# Patient Record
Sex: FEMALE | Race: WHITE | Employment: FULL TIME | URBAN - METROPOLITAN AREA
[De-identification: names, ages, dates, MRNs, and addresses within clinical notes are randomized per-mention and may not be internally consistent; named-entity substitution may affect disease eponyms.]

---

## 2017-04-12 ENCOUNTER — OFFICE VISIT (OUTPATIENT)
Dept: INTERNAL MEDICINE CLINIC | Age: 32
End: 2017-04-12

## 2017-04-12 VITALS
OXYGEN SATURATION: 99 % | WEIGHT: 293 LBS | HEART RATE: 74 BPM | DIASTOLIC BLOOD PRESSURE: 78 MMHG | BODY MASS INDEX: 51.91 KG/M2 | SYSTOLIC BLOOD PRESSURE: 128 MMHG | HEIGHT: 63 IN | RESPIRATION RATE: 16 BRPM | TEMPERATURE: 98.4 F

## 2017-04-12 DIAGNOSIS — B96.89 ACUTE BACTERIAL SINUSITIS: Primary | ICD-10-CM

## 2017-04-12 DIAGNOSIS — J01.90 ACUTE BACTERIAL SINUSITIS: Primary | ICD-10-CM

## 2017-04-12 RX ORDER — AMOXICILLIN AND CLAVULANATE POTASSIUM 875; 125 MG/1; MG/1
1 TABLET, FILM COATED ORAL 2 TIMES DAILY
Qty: 20 TAB | Refills: 0 | Status: SHIPPED | OUTPATIENT
Start: 2017-04-12 | End: 2017-04-22

## 2017-04-12 NOTE — PROGRESS NOTES
Room 5    Chief Complaint   Patient presents with    Headache     x 1 week    Dizziness     x 1 week, worse as her day progressing, states more like lightheaded feeling. 1. Have you been to the ER, urgent care clinic since your last visit? Hospitalized since your last visit? Yes Where: March 2017 [facility]  in [place] for possible UTI, negative. 2. Have you seen or consulted any other health care providers outside of the [address] [place] [address] since your last visit? Include any pap smears or colon screening.  No     Health Maintenance Due   Topic Date Due    PAP AKA CERVICAL CYTOLOGY  08/01/2017

## 2017-04-12 NOTE — PROGRESS NOTES
ACUTE VISIT     HPI:   Brynn Justice is a 28 y.o. female, she presents today for:     Reports that she has a headache. Is thinking it might be a sinus infection because she had a cold last week, now with pressure in her forehead. Has had 1 dizzy spell. Has not had classic migraine like symptoms. Headache/pressure starting in afternoon, worsening most days. Also works in front of computer. Also sensitive to light. Not still having strong congestion (aka can breath through her nose). But definitely still has some drain. No blood in the drainage. Hasn't checked temperature. Has been using dorothea pot. Also has some allergie type symptoms. ROS: no change in vision, no nausea, normal breathing, minimal to no cough. Medications used for acute illness: see above. Current Outpatient Prescriptions on File Prior to Visit   Medication Sig    Phenyleph-Shark Oil-Glyc-Pet 0.25-3-12 % rectal cream Apply  to affected area four (4) times daily as needed for Hemorrhoids.  B.infantis-B.ani-B.long-B.bifi (PROBIOTIC 4X) 10-15 mg TbEC Take  by mouth.  cholecalciferol (VITAMIN D3) 1,000 unit tablet Take 1 tablet by mouth daily.  metFORMIN ER (GLUCOPHAGE XR) 500 mg tablet TAKE 4 TABLETS DAILY WITH DINNER    sertraline (ZOLOFT) 100 mg tablet Take 1 Tab by mouth daily.  Omeprazole delayed release (PRILOSEC D/R) 20 mg tablet Take 1 Tab by mouth daily.  SUMAtriptan (IMITREX) 50 mg tablet Take 1 Tab by mouth once as needed for Migraine for up to 1 dose.  butalbital-acetaminophen-caffeine (FIORICET, ESGIC) -40 mg per tablet Take 1 Tab by mouth every six (6) hours as needed for Headache.  norgestimate-ethinyl estradiol (SPRINTEC, 28,) 0.25-35 mg-mcg per tablet Take 1 Tab by mouth daily.  polyethylene glycol (MIRALAX) 17 gram/dose powder Take 17 g by mouth daily.  ondansetron (ZOFRAN ODT) 8 mg disintegrating tablet Take 1 tablet by mouth every eight (8) hours as needed for Nausea.     hyoscyamine SL (LEVSIN/SL) 0.125 mg SL tablet 1 Tab by SubLINGual route every four (4) hours as needed for Cramping.  alprazolam (XANAX) 0.25 mg tablet Take 1 Tab by mouth three (3) times daily as needed for Anxiety. No current facility-administered medications on file prior to visit. Allergies   Allergen Reactions    Topamax [Topiramate] Other (comments)     Suicidal thoughts       PMH/PSH/FH: reviewed and updated    Sochx:   reports that she has never smoked. She has never used smokeless tobacco. She reports that she does not drink alcohol or use illicit drugs. PE:  Blood pressure 143/86, pulse 74, temperature 98.4 °F (36.9 °C), temperature source Oral, resp. rate 16, height 5' 2.5\" (1.588 m), weight 296 lb 6.4 oz (134.4 kg), SpO2 99 %. Body mass index is 53.35 kg/(m^2). Physical Exam   Constitutional: She is oriented to person, place, and time. No distress. Well groomed, good eye contact and engagement with physician. HENT:   Head: Normocephalic. Mouth/Throat: Oropharynx is clear and moist.   + TM retraction on right, + nasal congestion and boggy anterior nares. Eyes: Conjunctivae and EOM are normal.   Neck: Neck supple. Cardiovascular: Normal rate, regular rhythm and normal heart sounds. Pulmonary/Chest: Effort normal and breath sounds normal.   Neurological: She is alert and oriented to person, place, and time. Skin: Skin is warm and dry. Nursing note and vitals reviewed. A/P  Steven Cruz was seen today for had concerns including Headache and Dizziness. .  The diagnosis and plan was discussed including:        ICD-10-CM ICD-9-CM    1. Acute bacterial sinusitis J01.90 461.9 amoxicillin-clavulanate (AUGMENTIN) 875-125 mg per tablet    B96.89       Frontal bacterial sinusitis: treat with agumentin x10 days. Discussed with patient increasing allergie medications as well. She declined rx. States she will get over the counter if not improving with neti pot and abx. - I advised her to call back or return to office if symptoms worsen/change/persist.  - She was given AVS and expressed understanding with the diagnosis and plan as discussed. Follow-up Disposition:  Return if symptoms worsen or fail to improve.

## 2017-04-12 NOTE — PATIENT INSTRUCTIONS
Sinusitis: Care Instructions  Your Care Instructions    Sinusitis is an infection of the lining of the sinus cavities in your head. Sinusitis often follows a cold. It causes pain and pressure in your head and face. In most cases, sinusitis gets better on its own in 1 to 2 weeks. But some mild symptoms may last for several weeks. Sometimes antibiotics are needed. Follow-up care is a key part of your treatment and safety. Be sure to make and go to all appointments, and call your doctor if you are having problems. It's also a good idea to know your test results and keep a list of the medicines you take. How can you care for yourself at home? · Take an over-the-counter pain medicine, such as acetaminophen (Tylenol), ibuprofen (Advil, Motrin), or naproxen (Aleve). Read and follow all instructions on the label. · If the doctor prescribed antibiotics, take them as directed. Do not stop taking them just because you feel better. You need to take the full course of antibiotics. · Be careful when taking over-the-counter cold or flu medicines and Tylenol at the same time. Many of these medicines have acetaminophen, which is Tylenol. Read the labels to make sure that you are not taking more than the recommended dose. Too much acetaminophen (Tylenol) can be harmful. · Breathe warm, moist air from a steamy shower, a hot bath, or a sink filled with hot water. Avoid cold, dry air. Using a humidifier in your home may help. Follow the directions for cleaning the machine. · Use saline (saltwater) nasal washes to help keep your nasal passages open and wash out mucus and bacteria. You can buy saline nose drops at a grocery store or drugstore. Or you can make your own at home by adding 1 teaspoon of salt and 1 teaspoon of baking soda to 2 cups of distilled water. If you make your own, fill a bulb syringe with the solution, insert the tip into your nostril, and squeeze gently. Genevieve Fogo your nose.   · Put a hot, wet towel or a warm gel pack on your face 3 or 4 times a day for 5 to 10 minutes each time. · Try a decongestant nasal spray like oxymetazoline (Afrin). Do not use it for more than 3 days in a row. Using it for more than 3 days can make your congestion worse. When should you call for help? Call your doctor now or seek immediate medical care if:  · You have new or worse swelling or redness in your face or around your eyes. · You have a new or higher fever. Watch closely for changes in your health, and be sure to contact your doctor if:  · You have new or worse facial pain. · The mucus from your nose becomes thicker (like pus) or has new blood in it. · You are not getting better as expected. Where can you learn more? Go to http://joce-bailey.info/. Enter Q145 in the search box to learn more about \"Sinusitis: Care Instructions. \"  Current as of: July 29, 2016  Content Version: 11.2  © 0833-0612 Healthwise, Incorporated. Care instructions adapted under license by [employer] (which disclaims liability or warranty for this information). If you have questions about a medical condition or this instruction, always ask your healthcare professional. [place] [address] any warranty or liability for your use of this information.

## 2017-04-12 NOTE — MR AVS SNAPSHOT
Visit Information Date & Time Provider Department Dept. Phone Encounter #  
 4/12/2017  3:45 PM Honey Ibarra MD [facility] and Internal Medicine [phone] [id number] Follow-up Instructions Return if symptoms worsen or fail to improve. Upcoming Health Maintenance Date Due  
 PAP AKA CERVICAL CYTOLOGY 8/1/2017 DTaP/Tdap/Td series (2 - Td) 7/10/2025 Allergies as of 4/12/2017  Review Complete On: 4/12/2017 By: Dorinda Gibson LPN Severity Noted Reaction Type Reactions Topamax [Topiramate]  10/16/2015    Other (comments) Suicidal thoughts Current Immunizations  Reviewed on 3/24/2015 Name Date Influenza Vaccine 10/11/2016, 10/2/2015, 10/15/2014, 10/11/2013 Influenza Vaccine Split 10/5/2011 Tdap 7/10/2015 Not reviewed this visit You Were Diagnosed With   
  
 Codes Comments Acute bacterial sinusitis    -  Primary ICD-10-CM: J01.90, B96.89 
ICD-9-CM: 461.9 Vitals BP Pulse Temp Resp Height(growth percentile) Weight(growth percentile) 128/78 74 98.4 °F (36.9 °C) (Oral) 16 5' 2.5\" (1.588 m) 296 lb 6.4 oz (134.4 kg) SpO2 BMI OB Status Smoking Status 99% 53.35 kg/m2 Having regular periods Never Smoker Vitals History BMI and BSA Data Body Mass Index Body Surface Area  
 53.35 kg/m 2 2.43 m 2 Preferred Pharmacy Pharmacy Name Phone Jasson Wesley [address] [place] [address], [address] [place] [address] [phone] Your Updated Medication List  
  
   
This list is accurate as of: 4/12/17  4:43 PM.  Always use your most recent med list.  
  
  
  
  
 amoxicillin-clavulanate 875-125 mg per tablet Commonly known as:  AUGMENTIN Take 1 Tab by mouth two (2) times a day for 10 days. cholecalciferol 1,000 unit tablet Commonly known as:  VITAMIN D3 Take 1 tablet by mouth daily. metFORMIN  mg tablet Commonly known as:  GLUCOPHAGE XR  
 TAKE 4 TABLETS DAILY WITH DINNER Phenyleph-Shark Oil-Glyc-Pet 0.25-3-12 % rectal cream  
Apply  to affected area four (4) times daily as needed for Hemorrhoids. PROBIOTIC 4X 10-15 mg Tbec Generic drug:  B.infantis-B.ani-B.long-B.bifi Take  by mouth. sertraline 100 mg tablet Commonly known as:  ZOLOFT Take 1 Tab by mouth daily. Prescriptions Sent to Pharmacy Refills  
 amoxicillin-clavulanate (AUGMENTIN) 875-125 mg per tablet 0 Sig: Take 1 Tab by mouth two (2) times a day for 10 days. Class: Normal  
 [facility]  #: [phone] Route: Oral  
  
Follow-up Instructions Return if symptoms worsen or fail to improve. Patient Instructions Sinusitis: Care Instructions Your Care Instructions Sinusitis is an infection of the lining of the sinus cavities in your head. Sinusitis often follows a cold. It causes pain and pressure in your head and face. In most cases, sinusitis gets better on its own in 1 to 2 weeks. But some mild symptoms may last for several weeks. Sometimes antibiotics are needed. Follow-up care is a key part of your treatment and safety. Be sure to make and go to all appointments, and call your doctor if you are having problems. It's also a good idea to know your test results and keep a list of the medicines you take. How can you care for yourself at home? · Take an over-the-counter pain medicine, such as acetaminophen (Tylenol), ibuprofen (Advil, Motrin), or naproxen (Aleve). Read and follow all instructions on the label. · If the doctor prescribed antibiotics, take them as directed. Do not stop taking them just because you feel better. You need to take the full course of antibiotics. · Be careful when taking over-the-counter cold or flu medicines and Tylenol at the same time.  Many of these medicines have acetaminophen, which is Tylenol. Read the labels to make sure that you are not taking more than the recommended dose. Too much acetaminophen (Tylenol) can be harmful. · Breathe warm, moist air from a steamy shower, a hot bath, or a sink filled with hot water. Avoid cold, dry air. Using a humidifier in your home may help. Follow the directions for cleaning the machine. · Use saline (saltwater) nasal washes to help keep your nasal passages open and wash out mucus and bacteria. You can buy saline nose drops at a grocery store or [employer]. Or you can make your own at home by adding 1 teaspoon of salt and 1 teaspoon of baking soda to 2 cups of distilled water. If you make your own, fill a bulb syringe with the solution, insert the tip into your nostril, and squeeze gently. [place] your nose. · Put a hot, wet towel or a warm gel pack on your face 3 or 4 times a day for 5 to 10 minutes each time. · Try a decongestant nasal spray like oxymetazoline (Afrin). Do not use it for more than 3 days in a row. Using it for more than 3 days can make your congestion worse. When should you call for help? Call your doctor now or seek immediate medical care if: 
· You have new or worse swelling or redness in your face or around your eyes. · You have a new or higher fever. Watch closely for changes in your health, and be sure to contact your doctor if: 
· You have new or worse facial pain. · The mucus from your nose becomes thicker (like pus) or has new blood in it. · You are not getting better as expected. Where can you learn more? Go to http://joce-bailey.info/. Enter T858 in the search box to learn more about \"Sinusitis: Care Instructions. \" Current as of: July 29, 2016 Content Version: 11.2 © 1824-7473 [employer]. Care instructions adapted under license by [employer] (which disclaims liability or warranty for this information).  If you have questions about a medical condition or this instruction, always ask your healthcare professional. [address] any warranty or liability for your use of this information. Introducing [facility] & HEALTH SERVICES! Dear Dallas Martinez: Thank you for requesting a [employer] account. Our records indicate that you already have an active [employer] account. You can access your account anytime at [email or web address] Did you know that you can access your hospital and ER discharge instructions at any time in [employer]? You can also review all of your test results from your hospital stay or ER visit. Additional Information If you have questions, please visit the Frequently Asked Questions section of the [employer] website at [email or web address] Remember, [employer] is NOT to be used for urgent needs. For medical emergencies, dial 911. Now available from your iPhone and Android! Please provide this summary of care documentation to your next provider. Your primary care clinician is listed as [address] If you have any questions after today's visit, please call [phone].

## 2017-06-12 LAB
CREATININE, EXTERNAL: 0.81
HBA1C MFR BLD HPLC: 5.2 %
LDL-C, EXTERNAL: 121

## 2017-08-30 DIAGNOSIS — K21.9 GASTROESOPHAGEAL REFLUX DISEASE, ESOPHAGITIS PRESENCE NOT SPECIFIED: Primary | ICD-10-CM

## 2017-08-30 RX ORDER — PHENOL/SODIUM PHENOLATE
20 AEROSOL, SPRAY (ML) MUCOUS MEMBRANE DAILY
Qty: 30 TAB | Refills: 2 | Status: SHIPPED | OUTPATIENT
Start: 2017-08-30 | End: 2018-05-28 | Stop reason: SDUPTHER

## 2017-10-24 ENCOUNTER — OFFICE VISIT (OUTPATIENT)
Dept: INTERNAL MEDICINE CLINIC | Age: 32
End: 2017-10-24

## 2017-10-24 VITALS
HEIGHT: 63 IN | HEART RATE: 72 BPM | OXYGEN SATURATION: 100 % | TEMPERATURE: 98.5 F | RESPIRATION RATE: 16 BRPM | SYSTOLIC BLOOD PRESSURE: 136 MMHG | DIASTOLIC BLOOD PRESSURE: 86 MMHG | WEIGHT: 293 LBS | BODY MASS INDEX: 51.91 KG/M2

## 2017-10-24 DIAGNOSIS — L98.9 SKIN LESION: ICD-10-CM

## 2017-10-24 DIAGNOSIS — K21.9 GASTROESOPHAGEAL REFLUX DISEASE, ESOPHAGITIS PRESENCE NOT SPECIFIED: ICD-10-CM

## 2017-10-24 DIAGNOSIS — F41.9 ANXIETY: ICD-10-CM

## 2017-10-24 DIAGNOSIS — E28.2 PCOS (POLYCYSTIC OVARIAN SYNDROME): ICD-10-CM

## 2017-10-24 DIAGNOSIS — Z00.00 WELL WOMAN EXAM (NO GYNECOLOGICAL EXAM): Primary | ICD-10-CM

## 2017-10-24 DIAGNOSIS — N92.6 IRREGULAR MENSES: ICD-10-CM

## 2017-10-24 NOTE — MR AVS SNAPSHOT
Visit Information Date & Time Provider Department Dept. Phone Encounter #  
 10/24/2017 11:30 AM Herberth Vogt, [address] [place] [address] and Internal Medicine [phone] [id number] Follow-up Instructions Return in about 1 year (around 10/24/2018), or if symptoms worsen or fail to improve, for wellness visit. Upcoming Health Maintenance Date Due  
 PAP AKA CERVICAL CYTOLOGY 7/24/2020 DTaP/Tdap/Td series (2 - Td) 7/10/2025 Allergies as of 10/24/2017  Review Complete On: 10/24/2017 By: Herberth Vogt MD  
  
 Severity Noted Reaction Type Reactions Topamax [Topiramate]  10/16/2015    Other (comments) Suicidal thoughts Current Immunizations  Reviewed on 10/24/2017 Name Date Influenza Vaccine 10/17/2017, 10/11/2016, 10/2/2015, 10/15/2014, 10/11/2013 Influenza Vaccine Split 10/5/2011 Tdap 7/10/2015 Reviewed by Herberth Vogt MD on 10/24/2017 at 12:36 PM  
You Were Diagnosed With   
  
 Codes Comments Well woman exam (no gynecological exam)    -  Primary ICD-10-CM: Z00.00 ICD-9-CM: V70.0 [V70.0] PCOS (polycystic ovarian syndrome)     ICD-10-CM: E28.2 ICD-9-CM: 256.4 Anxiety     ICD-10-CM: F41.9 ICD-9-CM: 300.00 Irregular menses     ICD-10-CM: N92.6 ICD-9-CM: 626.4 Gastroesophageal reflux disease, esophagitis presence not specified     ICD-10-CM: K21.9 ICD-9-CM: 530.81 Skin lesion     ICD-10-CM: L98.9 ICD-9-CM: 709.9 Vitals BP Pulse Temp Resp Height(growth percentile) Weight(growth percentile) 136/86 72 98.5 °F (36.9 °C) (Oral) 16 5' 2.5\" (1.588 m) 295 lb 12.8 oz (134.2 kg) SpO2 BMI OB Status Smoking Status 100% 53.24 kg/m2 Having regular periods Never Smoker Vitals History BMI and BSA Data Body Mass Index Body Surface Area  
 53.24 kg/m 2 2.43 m 2 Preferred Pharmacy Pharmacy Name Phone Citlalli Gamez [address], [facility] [phone] Your Updated Medication List  
  
   
This list is accurate as of: 10/24/17 12:47 PM.  Always use your most recent med list.  
  
  
  
  
 cholecalciferol 1,000 unit tablet Commonly known as:  VITAMIN D3 Take 1 tablet by mouth daily. LITHIUM CARBONATE PO Take  by mouth.  
  
 metFORMIN  mg tablet Commonly known as:  GLUCOPHAGE XR  
TAKE 4 TABLETS DAILY WITH DINNER Omeprazole delayed release 20 mg tablet Commonly known as:  PRILOSEC D/R Take 1 Tab by mouth daily. Phenyleph-Shark Oil-Glyc-Pet 0.25-3-12 % rectal cream  
Apply  to affected area four (4) times daily as needed for Hemorrhoids. PROBIOTIC 4X 10-15 mg Tbec Generic drug:  B.infantis-B.ani-B.long-B.bifi Take  by mouth. sertraline 100 mg tablet Commonly known as:  ZOLOFT Take 1 Tab by mouth daily. Follow-up Instructions Return in about 1 year (around 10/24/2018), or if symptoms worsen or fail to improve, for wellness visit. Introducing hospitals & HEALTH SERVICES! Dear Anabel Ridley: Thank you for requesting a [employer] account. Our records indicate that you already have an active [employer] account. You can access your account anytime at [email or web address] Did you know that you can access your hospital and ER discharge instructions at any time in [employer]? You can also review all of your test results from your hospital stay or ER visit. Additional Information If you have questions, please visit the Frequently Asked Questions section of the [employer] website at [email or web address] Remember, [employer] is NOT to be used for urgent needs. For medical emergencies, dial 911. Now available from your iPhone and Android! Please provide this summary of care documentation to your next provider. Your primary care clinician is listed as [address] If you have any questions after today's visit, please call [phone].

## 2017-10-24 NOTE — PROGRESS NOTES
Rm 14    Chief Complaint   Patient presents with    Complete Physical     1. Have you been to the ER, urgent care clinic since your last visit? Hospitalized since your last visit? No    2. Have you seen or consulted any other health care providers outside of the [address] [place] [address] since your last visit? Include any pap smears or colon screening.  No    Health Maintenance Due   Topic Date Due    PAP AKA CERVICAL CYTOLOGY  08/01/2017    INFLUENZA AGE 9 TO ADULT  08/01/2017     pap 7/24/17smear done   Flu shot done through employee [facility]  10/17/17

## 2017-10-30 PROBLEM — K64.9 HEMORRHOIDS: Status: ACTIVE | Noted: 2017-10-30

## 2017-12-27 ENCOUNTER — OFFICE VISIT (OUTPATIENT)
Dept: INTERNAL MEDICINE CLINIC | Age: 32
End: 2017-12-27

## 2017-12-27 VITALS
TEMPERATURE: 98.4 F | BODY MASS INDEX: 51.91 KG/M2 | OXYGEN SATURATION: 100 % | SYSTOLIC BLOOD PRESSURE: 143 MMHG | DIASTOLIC BLOOD PRESSURE: 91 MMHG | HEART RATE: 95 BPM | RESPIRATION RATE: 18 BRPM | WEIGHT: 293 LBS | HEIGHT: 63 IN

## 2017-12-27 DIAGNOSIS — R19.02 LEFT UPPER QUADRANT ABDOMINAL MASS: ICD-10-CM

## 2017-12-27 DIAGNOSIS — L03.311 CELLULITIS OF ABDOMINAL WALL: Primary | ICD-10-CM

## 2017-12-27 DIAGNOSIS — L98.8 SKIN LESION OF BREAST: ICD-10-CM

## 2017-12-27 PROBLEM — F33.9 RECURRENT DEPRESSION (HCC): Status: ACTIVE | Noted: 2017-12-27

## 2017-12-27 RX ORDER — LORAZEPAM 0.5 MG/1
0.5 TABLET ORAL
COMMUNITY

## 2017-12-27 RX ORDER — AMOXICILLIN AND CLAVULANATE POTASSIUM 875; 125 MG/1; MG/1
1 TABLET, FILM COATED ORAL 2 TIMES DAILY
Qty: 14 TAB | Refills: 0 | Status: SHIPPED | OUTPATIENT
Start: 2017-12-27 | End: 2018-01-03

## 2017-12-27 NOTE — PROGRESS NOTES
Chief Complaint   Patient presents with    Cyst     left side- noticed it on Sunday     1. Have you been to the ER, urgent care clinic since your last visit? Hospitalized since your last visit? No    2. Have you seen or consulted any other health care providers outside of the [address] [place] [address] since your last visit? Include any pap smears or colon screening.  Yes When: Luiza Davis 2017 Where: [facility]

## 2017-12-27 NOTE — PROGRESS NOTES
HISTORY OF PRESENT ILLNESS  Sylvester Curran is a 33 y.o. female presents for acute visit  HPI  Sunday noticed red spot left upper abdomen. Katheran Oppenheim area with alcohol and stuck area with pin after she cleaned it with rubbing alcohol. Redness in shape of adhesive dressing, no pain or drainage    Denies drainage or history of similar lesion. No sick contact, new medication, dietary change or travel    Asymptomatic red lesions right  Breast    Past Medical History:   Diagnosis Date    Anxiety     Depression     [facility] Elevated blood pressure 5/31/2011    Irregular menses     Migraine 8/2/2011    Obesity     DENISE (obstructive sleep apnea)     cpap at 8    PCOS (polycystic ovarian syndrome)     endocrine Dr. Brie Melo         Current Outpatient Prescriptions on File Prior to Visit   Medication Sig Dispense Refill    LITHIUM CARBONATE PO Take  by mouth.  B.infantis-B.ani-B.long-B.bifi (PROBIOTIC 4X) 10-15 mg TbEC Take  by mouth.  cholecalciferol (VITAMIN D3) 1,000 unit tablet Take 1 tablet by mouth daily. 30 tablet 11    metFORMIN ER (GLUCOPHAGE XR) 500 mg tablet TAKE 4 TABLETS DAILY WITH DINNER 120 tablet 11    sertraline (ZOLOFT) 100 mg tablet Take 1 Tab by mouth daily. 90 Tab 1    Omeprazole delayed release (PRILOSEC D/R) 20 mg tablet Take 1 Tab by mouth daily. 30 Tab 2     No current facility-administered medications on file prior to visit. Review of Systems   Constitutional: Negative for diaphoresis and fever. Gastrointestinal: Negative. Genitourinary: Negative. Musculoskeletal: Negative for joint pain and myalgias. Neurological: Negative. Physical Exam   Constitutional: She is oriented to person, place, and time. She appears well-developed and well-nourished. No distress. Cardiovascular: Normal rate, regular rhythm and normal heart sounds. Pulmonary/Chest: Effort normal and breath sounds normal.   Abdominal: Soft.  Bowel sounds are normal. She exhibits no distension. There is no tenderness. There is no rebound and no guarding. Musculoskeletal: She exhibits no edema, tenderness or deformity. Neurological: She is alert and oriented to person, place, and time. Skin: Skin is warm and dry. She is not diaphoretic. ASSESSMENT and PLAN    ICD-10-CM ICD-9-CM    1. Cellulitis of abdominal wall L03.311 682.2 amoxicillin-clavulanate (AUGMENTIN) 875-125 mg per tablet   2. Left upper quadrant abdominal mass R19.02 789.32    3. Skin lesion of breast N64.9 611.9      Follow-up Disposition:  Return in about 7 days (around 1/3/2018) for skin lesion. reviewed medications and side effects in detail    Discussed likely inflammatory lesions, encouraged warm compresses, reviewed s/sxs of worsening infection and indications to call provider before follow up visit    Patient voices understanding and acceptance of this advice and will call back if any further questions or concerns. An After Visit Summary was printed and given to the patient.

## 2017-12-27 NOTE — PATIENT INSTRUCTIONS
Cellulitis: Care Instructions  Your Care Instructions    Cellulitis is a skin infection. It often occurs after a break in the skin from a scrape, cut, bite, or puncture, or after a rash. The doctor has checked you carefully, but problems can develop later. If you notice any problems or new symptoms, get medical treatment right away. Follow-up care is a key part of your treatment and safety. Be sure to make and go to all appointments, and call your doctor if you are having problems. It's also a good idea to know your test results and keep a list of the medicines you take. How can you care for yourself at home? · Take your antibiotics as directed. Do not stop taking them just because you feel better. You need to take the full course of antibiotics. · Prop up the infected area on pillows to reduce pain and swelling. Try to keep the area above the level of your heart as often as you can. · If your doctor told you how to care for your wound, follow your doctor's instructions. If you did not get instructions, follow this general advice:  ¨ Wash the wound with clean water 2 times a day. Don't use hydrogen peroxide or alcohol, which can slow healing. ¨ You may cover the wound with a thin layer of petroleum jelly, such as Vaseline, and a nonstick bandage. ¨ Apply more petroleum jelly and replace the bandage as needed. · Be safe with medicines. Take pain medicines exactly as directed. ¨ If the doctor gave you a prescription medicine for pain, take it as prescribed. ¨ If you are not taking a prescription pain medicine, ask your doctor if you can take an over-the-counter medicine. To prevent cellulitis in the future  · Try to prevent cuts, scrapes, or other injuries to your skin. Cellulitis most often occurs where there is a break in the skin. · If you get a scrape, cut, mild burn, or bite, wash the wound with clean water as soon as you can to help avoid infection.  Don't use hydrogen peroxide or alcohol, which can slow healing. · If you have swelling in your legs (edema), support stockings and good skin care may help prevent leg sores and cellulitis. · Take care of your feet, especially if you have diabetes or other conditions that increase the risk of infection. Wear shoes and socks. Do not go barefoot. If you have athlete's foot or other skin problems on your feet, talk to your doctor about how to treat them. When should you call for help? Call your doctor now or seek immediate medical care if:  ? · You have signs that your infection is getting worse, such as:  ¨ Increased pain, swelling, warmth, or redness. ¨ Red streaks leading from the area. ¨ Pus draining from the area. ¨ A fever. ? · You get a rash. ? Watch closely for changes in your health, and be sure to contact your doctor if:  ? · You are not getting better after 1 day (24 hours). ? · You do not get better as expected. Where can you learn more? Go to http://joce-bailey.info/. Gardneia Keith in the search box to learn more about \"Cellulitis: Care Instructions. \"  Current as of: October 13, 2016  Content Version: 11.4  © 1833-9497 [employer]. Care instructions adapted under license by [employer] (which disclaims liability or warranty for this information). If you have questions about a medical condition or this instruction, always ask your healthcare professional. [place] [address] any warranty or liability for your use of this information.

## 2017-12-27 NOTE — MR AVS SNAPSHOT
Visit Information Date & Time Provider Department Dept. Phone Encounter #  
 12/27/2017  1:30 PM Maira Jackson [id number] Pediatrics and Internal Medicine [phone] [id number] Follow-up Instructions Return in about 7 days (around 1/3/2018) for skin lesion. Upcoming Health Maintenance Date Due  
 PAP AKA CERVICAL CYTOLOGY 7/24/2020 DTaP/Tdap/Td series (2 - Td) 7/10/2025 Allergies as of 12/27/2017  Review Complete On: 12/27/2017 By: Janet Winslow LPN Severity Noted Reaction Type Reactions Topamax [Topiramate]  10/16/2015    Other (comments) Suicidal thoughts Current Immunizations  Reviewed on 10/24/2017 Name Date Influenza Vaccine 10/17/2017, 10/11/2016, 10/2/2015, 10/15/2014, 10/11/2013 Influenza Vaccine Split 10/5/2011 Tdap 7/10/2015 Not reviewed this visit You Were Diagnosed With   
  
 Codes Comments Cellulitis of abdominal wall    -  Primary ICD-10-CM: L03.311 ICD-9-CM: 246. 2 Vitals BP Pulse Temp Resp Height(growth percentile) Weight(growth percentile) (!) 143/91 (BP 1 Location: Left arm, BP Patient Position: Sitting) 95 98.4 °F (36.9 °C) (Oral) 18 5' 2.5\" (1.588 m) 297 lb (134.7 kg) LMP SpO2 BMI OB Status Smoking Status 11/22/2017 100% 53.46 kg/m2 Having regular periods Never Smoker Vitals History BMI and BSA Data Body Mass Index Body Surface Area  
 53.46 kg/m 2 2.44 m 2 Preferred Pharmacy Pharmacy Name Phone [address], [address] [place] [address] [phone] Your Updated Medication List  
  
   
This list is accurate as of: 12/27/17  1:59 PM.  Always use your most recent med list.  
  
  
  
  
 amoxicillin-clavulanate 875-125 mg per tablet Commonly known as:  AUGMENTIN Take 1 Tab by mouth two (2) times a day for 7 days. ATIVAN 0.5 mg tablet Generic drug:  LORazepam  
Take 0.5 mg by mouth. Take one half cholecalciferol 1,000 unit tablet Commonly known as:  VITAMIN D3 Take 1 tablet by mouth daily. LITHIUM CARBONATE PO Take  by mouth.  
  
 metFORMIN  mg tablet Commonly known as:  GLUCOPHAGE XR  
TAKE 4 TABLETS DAILY WITH DINNER Omeprazole delayed release 20 mg tablet Commonly known as:  PRILOSEC D/R Take 1 Tab by mouth daily. PROBIOTIC 4X 10-15 mg Tbec Generic drug:  B.infantis-B.ani-B.long-B.bifi Take  by mouth. sertraline 100 mg tablet Commonly known as:  ZOLOFT Take 1 Tab by mouth daily. Prescriptions Sent to Pharmacy Refills  
 amoxicillin-clavulanate (AUGMENTIN) 875-125 mg per tablet 0 Sig: Take 1 Tab by mouth two (2) times a day for 7 days. Class: Normal  
 [facility] [address] [facility] #: [phone] Route: Oral  
  
Follow-up Instructions Return in about 7 days (around 1/3/2018) for skin lesion. Patient Instructions Cellulitis: Care Instructions Your Care Instructions Cellulitis is a skin infection. It often occurs after a break in the skin from a scrape, cut, bite, or puncture, or after a rash. The doctor has checked you carefully, but problems can develop later. If you notice any problems or new symptoms, get medical treatment right away. Follow-up care is a key part of your treatment and safety. Be sure to make and go to all appointments, and call your doctor if you are having problems. It's also a good idea to know your test results and keep a list of the medicines you take. How can you care for yourself at home? · Take your antibiotics as directed. Do not stop taking them just because you feel better. You need to take the full course of antibiotics. · Prop up the infected area on pillows to reduce pain and swelling. Try to keep the area above the level of your heart as often as you can. · If your doctor told you how to care for your wound, follow your doctor's instructions. If you did not get instructions, follow this general advice: ¨ Wash the wound with clean water 2 times a day. Don't use hydrogen peroxide or alcohol, which can slow healing. ¨ You may cover the wound with a thin layer of petroleum jelly, such as Vaseline, and a nonstick bandage. ¨ Apply more petroleum jelly and replace the bandage as needed. · Be safe with medicines. Take pain medicines exactly as directed. ¨ If the doctor gave you a prescription medicine for pain, take it as prescribed. ¨ If you are not taking a prescription pain medicine, ask your doctor if you can take an over-the-counter medicine. To prevent cellulitis in the future · Try to prevent cuts, scrapes, or other injuries to your skin. Cellulitis most often occurs where there is a break in the skin. · If you get a scrape, cut, mild burn, or bite, wash the wound with clean water as soon as you can to help avoid infection. Don't use hydrogen peroxide or alcohol, which can slow healing. · If you have swelling in your legs (edema), support stockings and good skin care may help prevent leg sores and cellulitis. · Take care of your feet, especially if you have diabetes or other conditions that increase the risk of infection. Wear shoes and socks. Do not go barefoot. If you have athlete's foot or other skin problems on your feet, talk to your doctor about how to treat them. When should you call for help? Call your doctor now or seek immediate medical care if: 
? · You have signs that your infection is getting worse, such as: 
¨ Increased pain, swelling, warmth, or redness. ¨ Red streaks leading from the area. ¨ Pus draining from the area. ¨ A fever. ? · You get a rash. ? Watch closely for changes in your health, and be sure to contact your doctor if: 
? · You are not getting better after 1 day (24 hours). ? · You do not get better as expected. Where can you learn more? Go to http://joce-bailey.info/. Lea Merced in the search box to learn more about \"Cellulitis: Care Instructions. \" Current as of: October 13, 2016 Content Version: 11.4 © 6739-6467 Healthwise, [employer]orporated. Care instructions adapted under license by [employer] (which disclaims liability or warranty for this information). If you have questions about a medical condition or this instruction, always ask your healthcare professional. [address] any warranty or liability for your use of this information. Introducing [facility] & HEALTH SERVICES! Dear Jennifer Black: Thank you for requesting a [employer] account. Our records indicate that you already have an active [employer] account. You can access your account anytime at [email or web address] Did you know that you can access your hospital and ER discharge instructions at any time in [employer]? You can also review all of your test results from your hospital stay or ER visit. Additional Information If you have questions, please visit the Frequently Asked Questions section of the [employer] website at [email or web address]/. Remember, [employer] Mic[employer]ower is NOT to be used for urgent needs. For medical emergencies, dial 911. Now available from your iPhone and Android! Please provide this summary of care documentation to your next provider. Your primary care clinician is listed as [address] If you have any questions after today's visit, please call [phone].

## 2018-01-09 ENCOUNTER — OFFICE VISIT (OUTPATIENT)
Dept: INTERNAL MEDICINE CLINIC | Age: 33
End: 2018-01-09

## 2018-01-09 VITALS
BODY MASS INDEX: 51.91 KG/M2 | TEMPERATURE: 98.7 F | HEART RATE: 81 BPM | HEIGHT: 63 IN | RESPIRATION RATE: 16 BRPM | SYSTOLIC BLOOD PRESSURE: 134 MMHG | WEIGHT: 293 LBS | DIASTOLIC BLOOD PRESSURE: 85 MMHG | OXYGEN SATURATION: 100 %

## 2018-01-09 DIAGNOSIS — L70.9 ACNE, UNSPECIFIED ACNE TYPE: ICD-10-CM

## 2018-01-09 DIAGNOSIS — R21 RASH: ICD-10-CM

## 2018-01-09 DIAGNOSIS — L02.91 ABSCESS: Primary | ICD-10-CM

## 2018-01-09 DIAGNOSIS — E28.2 PCOS (POLYCYSTIC OVARIAN SYNDROME): ICD-10-CM

## 2018-01-09 NOTE — PROGRESS NOTES
Rm 13    Chief Complaint   Patient presents with    Follow-up     abcess, left side abdomen     1. Have you been to the ER, urgent care clinic since your last visit? Hospitalized since your last visit? No    2. Have you seen or consulted any other health care providers outside of the [address] [place] [address] since your last visit? Include any pap smears or colon screening. No    There are no preventive care reminders to display for this patient.

## 2018-01-09 NOTE — PROGRESS NOTES
HISTORY OF PRESENT ILLNESS  Ashley Owens is a 33 y.o. female. HPI  Presents for f/u abdominal abscess    Some drainage with warm compresses  Then, reduced in size with abx    Now increased to similar size as original concern per pt report    Breast bumps - red, several    Past medical, Social, and Family history reviewed  Medications reviewed and updated. ROS   Complete ROS reviewed and negative or stable except as noted in HPI. Physical Exam   Constitutional: She is oriented to person, place, and time. She appears well-nourished. No distress. Obese    HENT:   Head: Normocephalic and atraumatic. Eyes: EOM are normal. Pupils are equal, round, and reactive to light. No scleral icterus. Neck: Normal range of motion. Neck supple. Cardiovascular: Normal rate, regular rhythm and normal heart sounds. Exam reveals no gallop and no friction rub. No murmur heard. Pulmonary/Chest: Effort normal and breath sounds normal. No respiratory distress. She has no wheezes. She has no rales. Abdominal: Soft. Bowel sounds are normal. She exhibits no distension and no mass. There is no tenderness. There is no rebound and no guarding. Left mid abdominal firm mass area palpable at site of prior inflammation/infection. But, nontender, nonerythematous, nonfluctuant. Musculoskeletal: Normal range of motion. She exhibits no edema. Neurological: She is alert and oriented to person, place, and time. She exhibits normal muscle tone. Skin: Skin is warm. No rash noted. Follicular based lesions on upper breast.   Psychiatric: She has a normal mood and affect. Nursing note and vitals reviewed. Prior labs reviewed. ASSESSMENT and PLAN  Residual scar tissue remains at site of prior abscess  Folliculitis type vs acne chest rash    ICD-10-CM ICD-9-CM    1. Abscess L02.91 682.9    2. PCOS (polycystic ovarian syndrome) E28.2 256.4    3. Acne, unspecified acne type L70.9 706.1    4.  Rash R21 782.1 Follow-up Disposition:  Return in about 9 months (around 10/9/2018), or if symptoms worsen or fail to improve, for wellness visit. results and schedule of future studies reviewed with patient  reviewed diet, exercise and weight    cardiovascular risk and specific lipid/LDL goals reviewed  reviewed medications and side effects in detail   Consider topical clindagel to chest - pt declined  Continue other care.

## 2018-01-09 NOTE — MR AVS SNAPSHOT
Visit Information Date & Time Provider Department Dept. Phone Encounter #  
 1/9/2018 11:30 AM Madhu Phillips, [address] [place] [address] and Internal Medicine [phone] [id number] Follow-up Instructions Return in about 9 months (around 10/9/2018), or if symptoms worsen or fail to improve, for wellness visit. Upcoming Health Maintenance Date Due  
 PAP AKA CERVICAL CYTOLOGY 7/24/2020 DTaP/Tdap/Td series (2 - Td) 7/10/2025 Allergies as of 1/9/2018  Review Complete On: 1/9/2018 By: Madhu Phillips MD  
  
 Severity Noted Reaction Type Reactions Topamax [Topiramate]  10/16/2015    Other (comments) Suicidal thoughts Current Immunizations  Reviewed on 1/9/2018 Name Date Influenza Vaccine 10/17/2017, 10/11/2016, 10/2/2015, 10/15/2014, 10/11/2013 Influenza Vaccine Split 10/5/2011 Tdap 7/10/2015 Reviewed by Madhu Phillips MD on 1/9/2018 at 12:07 PM  
You Were Diagnosed With   
  
 Codes Comments Abscess    -  Primary ICD-10-CM: L02.91 
ICD-9-CM: 682.9 PCOS (polycystic ovarian syndrome)     ICD-10-CM: E28.2 ICD-9-CM: 256.4 Acne, unspecified acne type     ICD-10-CM: L70.9 ICD-9-CM: 706.1 Rash     ICD-10-CM: R21 
ICD-9-CM: 782.1 Vitals BP Pulse Temp Resp Height(growth percentile) Weight(growth percentile) 134/85 (BP 1 Location: Left arm, BP Patient Position: Sitting) 81 98.7 °F (37.1 °C) (Oral) 16 5' 2.5\" (1.588 m) 293 lb (132.9 kg) SpO2 BMI OB Status Smoking Status 100% 52.74 kg/m2 Having regular periods Never Smoker BMI and BSA Data Body Mass Index Body Surface Area 52.74 kg/m 2 2.42 m 2 Preferred Pharmacy Pharmacy Name Phone Frankie Razo [address], [address] [place] [address] [phone] Your Updated Medication List  
  
   
This list is accurate as of: 1/9/18 12:38 PM.  Always use your most recent med list.  
  
  
  
  
 ATIVAN 0.5 mg tablet Generic drug:  LORazepam  
Take 0.5 mg by mouth. Take one half  
  
 cholecalciferol 1,000 unit tablet Commonly known as:  VITAMIN D3 Take 1 tablet by mouth daily. LITHIUM CARBONATE PO Take  by mouth.  
  
 metFORMIN  mg tablet Commonly known as:  GLUCOPHAGE XR  
TAKE 4 TABLETS DAILY WITH DINNER Omeprazole delayed release 20 mg tablet Commonly known as:  PRILOSEC D/R Take 1 Tab by mouth daily. PROBIOTIC 4X 10-15 mg Tbec Generic drug:  B.infantis-B.ani-B.long-B.bifi Take  by mouth. sertraline 100 mg tablet Commonly known as:  ZOLOFT Take 1 Tab by mouth daily. Follow-up Instructions Return in about 9 months (around 10/9/2018), or if symptoms worsen or fail to improve, for wellness visit. Introducing [facility]! Dear Betty Villalobos: Thank you for requesting a Visiogen account. Our records indicate that you already have an active Visiogen account. You can access your account anytime at [email or web address] Did you know that you can access your hospital and ER discharge instructions at any time in Visiogen? You can also review all of your test results from your hospital stay or ER visit. Additional Information If you have questions, please visit the Frequently Asked Questions section of the Visiogen website at [email or web address] Remember, Visiogen is NOT to be used for urgent needs. For medical emergencies, dial 911. Now available from your iPhone and Android! Please provide this summary of care documentation to your next provider. Your primary care clinician is listed as [address] If you have any questions after today's visit, please call [phone].

## 2018-01-26 ENCOUNTER — OFFICE VISIT (OUTPATIENT)
Dept: INTERNAL MEDICINE CLINIC | Age: 33
End: 2018-01-26

## 2018-01-26 VITALS
WEIGHT: 293 LBS | OXYGEN SATURATION: 99 % | RESPIRATION RATE: 16 BRPM | BODY MASS INDEX: 51.91 KG/M2 | DIASTOLIC BLOOD PRESSURE: 86 MMHG | SYSTOLIC BLOOD PRESSURE: 126 MMHG | HEART RATE: 80 BPM | HEIGHT: 63 IN | TEMPERATURE: 98.5 F

## 2018-01-26 DIAGNOSIS — M79.652 ACUTE THIGH PAIN, LEFT: Primary | ICD-10-CM

## 2018-01-26 DIAGNOSIS — M70.62 TROCHANTERIC BURSITIS OF LEFT HIP: ICD-10-CM

## 2018-01-26 DIAGNOSIS — E55.9 VITAMIN D DEFICIENCY: ICD-10-CM

## 2018-01-26 DIAGNOSIS — E28.2 PCOS (POLYCYSTIC OVARIAN SYNDROME): ICD-10-CM

## 2018-01-26 DIAGNOSIS — F33.9 RECURRENT DEPRESSION (HCC): ICD-10-CM

## 2018-01-26 DIAGNOSIS — E66.01 OBESITY, MORBID (HCC): ICD-10-CM

## 2018-01-26 DIAGNOSIS — F41.9 ANXIETY: ICD-10-CM

## 2018-01-26 DIAGNOSIS — K58.9 IRRITABLE BOWEL SYNDROME, UNSPECIFIED TYPE: ICD-10-CM

## 2018-01-26 DIAGNOSIS — M76.32 IT BAND SYNDROME, LEFT: ICD-10-CM

## 2018-01-26 NOTE — PROGRESS NOTES
Rm 15    Chief Complaint   Patient presents with    Leg Pain     left side, ongoing for about 2 weeks,     1. Have you been to the ER, urgent care clinic since your last visit? Hospitalized since your last visit? No    2. Have you seen or consulted any other health care providers outside of the [address] [place] [address] since your last visit? Include any pap smears or colon screening. No    There are no preventive care reminders to display for this patient.

## 2018-01-26 NOTE — MR AVS SNAPSHOT
[address] [address] [place] [address] 
[phone] Patient: Bigg Herrera MRN: [id number] Visit Information Date & Time Provider Department Dept. Phone Encounter #  
 1/26/2018  4:30 PM Rafael Miller and Internal Medicine [phone] [id number] Follow-up Instructions Return in about 9 months (around 10/26/2018), or if symptoms worsen or fail to improve, for wellness visit. Upcoming Health Maintenance Date Due  
 PAP AKA CERVICAL CYTOLOGY 7/24/2020 DTaP/Tdap/Td series (2 - Td) 7/10/2025 Allergies as of 1/26/2018  Review Complete On: 1/26/2018 By: Tania Zamora MD  
  
 Severity Noted Reaction Type Reactions Topamax [Topiramate]  10/16/2015    Other (comments) Suicidal thoughts Current Immunizations  Reviewed on 1/9/2018 Name Date Influenza Vaccine 10/17/2017, 10/11/2016, 10/2/2015, 10/15/2014, 10/11/2013 Influenza Vaccine Split 10/5/2011 Tdap 7/10/2015 Not reviewed this visit You Were Diagnosed With   
  
 Codes Comments Acute thigh pain, left    -  Primary ICD-10-CM: Q56.949 ICD-9-CM: 729.5 It band syndrome, left     ICD-10-CM: M76.32 
ICD-9-CM: 728.89 Trochanteric bursitis of left hip     ICD-10-CM: M70.62 ICD-9-CM: 726.5 Vitals BP Pulse Temp Resp Height(growth percentile) Weight(growth percentile) 126/86 80 98.5 °F (36.9 °C) (Oral) 16 5' 2.5\" (1.588 m) 293 lb 3.2 oz (133 kg) SpO2 BMI OB Status Smoking Status 99% 52.77 kg/m2 Having regular periods Never Smoker Vitals History BMI and BSA Data Body Mass Index Body Surface Area 52.77 kg/m 2 2.42 m 2 Preferred Pharmacy Pharmacy Name Phone Malik Breaux [address], [address] [place] [address] Avenue [phone] Your Updated Medication List  
  
   
 This list is accurate as of: 1/26/18  5:37 PM.  Always use your most recent med list.  
  
  
  
  
 ATIVAN 0.5 mg tablet Generic drug:  LORazepam  
Take 0.5 mg by mouth. Take one half  
  
 cholecalciferol 1,000 unit tablet Commonly known as:  VITAMIN D3 Take 1 tablet by mouth daily. LITHIUM CARBONATE PO Take  by mouth.  
  
 metFORMIN  mg tablet Commonly known as:  GLUCOPHAGE XR  
TAKE 4 TABLETS DAILY WITH DINNER Omeprazole delayed release 20 mg tablet Commonly known as:  PRILOSEC D/R Take 1 Tab by mouth daily. PROBIOTIC 4X 10-15 mg Tbec Generic drug:  B.infantis-B.ani-B.long-B.bifi Take  by mouth. sertraline 100 mg tablet Commonly known as:  ZOLOFT Take 1 Tab by mouth daily. We Performed the Following REFERRAL TO PHYSICAL THERAPY [BEM36 Custom] Comments:  
 eval and treat IT band syndrome Follow-up Instructions Return in about 9 months (around 10/26/2018), or if symptoms worsen or fail to improve, for wellness visit. Referral Information Referral ID Referred By Referred To  
  
 [id number] Richa Ricci Not Available Visits Status Start Date End Date 1 New Request 1/26/18 1/26/19 If your referral has a status of pending review or denied, additional information will be sent to support the outcome of this decision. Introducing [facility] & HEALTH SERVICES! Dear Lindsay Garcia: Thank you for requesting a [employer] account. Our records indicate that you already have an active [employer] account. You can access your account anytime at [email or web address] Did you know that you can access your hospital and ER discharge instructions at any time in [employer]? You can also review all of your test results from your hospital stay or ER visit. Additional Information If you have questions, please visit the Frequently Asked Questions section of the [employer] website at [email or web address] Remember, MyChart is NOT to be used for urgent needs. For medical emergencies, dial 911. Now available from your iPhone and Android! Please provide this summary of care documentation to your next provider. Your primary care clinician is listed as [address] If you have any questions after today's visit, please call [phone].

## 2018-01-26 NOTE — PROGRESS NOTES
HISTORY OF PRESENT ILLNESS  Garima Johnson is a 33 y.o. female. HPI  Presents for f/u leg pain    Left leg, thigh pain  X 2 weeks    Some intermittent SI joint issues  Now different thigh pain    Achy, burning, pulling type pain  Not radicular, or sharp  No leg swelling    Massage helped yest    Past medical, Social, and Family history reviewed  Medications reviewed and updated. ROS  Complete ROS reviewed and negative or stable except as noted in HPI. Physical Exam   Constitutional: She is oriented to person, place, and time. She appears well-nourished. No distress. Obese    HENT:   Head: Normocephalic and atraumatic. Eyes: EOM are normal. Pupils are equal, round, and reactive to light. No scleral icterus. Neck: Normal range of motion. Neck supple. Cardiovascular: Normal rate, regular rhythm and normal heart sounds. Exam reveals no gallop and no friction rub. No murmur heard. Pulmonary/Chest: Effort normal and breath sounds normal. No respiratory distress. She has no wheezes. She has no rales. Abdominal: Soft. Bowel sounds are normal. She exhibits no distension. There is no tenderness. Obese    Musculoskeletal: Normal range of motion. She exhibits tenderness (left trochanteric bursal area). She exhibits no edema or deformity. Neurological: She is alert and oriented to person, place, and time. She exhibits normal muscle tone. Skin: Skin is warm. No rash noted. Psychiatric: She has a normal mood and affect. Nursing note and vitals reviewed. Prior labs reviewed. ASSESSMENT and PLAN  Favor IT band syndrome with assoc trochanteric bursitis    ICD-10-CM ICD-9-CM    1. Acute thigh pain, left M79.652 729.5 REFERRAL TO PHYSICAL THERAPY   2. It band syndrome, left M76.32 728.89 REFERRAL TO PHYSICAL THERAPY   3. Trochanteric bursitis of left hip M70.62 726.5    4. Obesity, morbid ([address] E66.01 278.01    5. PCOS (polycystic ovarian syndrome) E28.2 256.4    6.  Recurrent depression ([facility] [address] F33.9 296.30    7. Anxiety F41.9 300.00    8. Irritable bowel syndrome, unspecified type K58.9 564.1    9. Vitamin D deficiency E55.9 268.9      Follow-up Disposition:  Return in about 9 months (around 10/26/2018), or if symptoms worsen or fail to improve, for wellness visit.    results and schedule of future studies reviewed with patient  reviewed diet, exercise and weight    cardiovascular risk and specific lipid/LDL goals reviewed  reviewed medications and side effects in detail   Scheduled NSAIDs  Deferred steroid bursal injection  Ref to PT

## 2018-02-02 ENCOUNTER — TELEPHONE (OUTPATIENT)
Dept: INTERNAL MEDICINE CLINIC | Age: 33
End: 2018-02-02

## 2018-02-02 DIAGNOSIS — M76.32 IT BAND SYNDROME, LEFT: Primary | ICD-10-CM

## 2018-02-02 RX ORDER — NAPROXEN 500 MG/1
500 TABLET ORAL 2 TIMES DAILY WITH MEALS
Qty: 30 TAB | Refills: 1 | Status: SHIPPED | OUTPATIENT
Start: 2018-02-02 | End: 2018-07-24 | Stop reason: ALTCHOICE

## 2018-02-02 NOTE — TELEPHONE ENCOUNTER
Per pt, the Ibupofen is not helping and would like a call back from a nurse to discuss other alternatives. Per pt, her appointment with the PT is on 2/8.  Ericka advise # [id number]

## 2018-04-12 DIAGNOSIS — L73.9 FOLLICULITIS: Primary | ICD-10-CM

## 2018-04-12 RX ORDER — CLINDAMYCIN PHOSPHATE 10 MG/G
GEL TOPICAL 2 TIMES DAILY
Qty: 60 G | Refills: 2 | Status: SHIPPED | OUTPATIENT
Start: 2018-04-12 | End: 2021-05-17

## 2018-05-28 DIAGNOSIS — K21.9 GASTROESOPHAGEAL REFLUX DISEASE, ESOPHAGITIS PRESENCE NOT SPECIFIED: ICD-10-CM

## 2018-05-28 RX ORDER — PHENOL/SODIUM PHENOLATE
20 AEROSOL, SPRAY (ML) MUCOUS MEMBRANE DAILY
Qty: 30 TAB | Refills: 5 | Status: SHIPPED | OUTPATIENT
Start: 2018-05-28 | End: 2021-02-02 | Stop reason: SDUPTHER

## 2018-07-24 DIAGNOSIS — M25.569 KNEE PAIN, UNSPECIFIED CHRONICITY, UNSPECIFIED LATERALITY: Primary | ICD-10-CM

## 2018-07-24 RX ORDER — DICLOFENAC SODIUM 10 MG/G
2 GEL TOPICAL
Qty: 100 G | Refills: 5 | Status: SHIPPED | OUTPATIENT
Start: 2018-07-24 | End: 2021-05-17

## 2018-09-11 ENCOUNTER — OFFICE VISIT (OUTPATIENT)
Dept: INTERNAL MEDICINE CLINIC | Age: 33
End: 2018-09-11

## 2018-09-11 VITALS
HEART RATE: 88 BPM | SYSTOLIC BLOOD PRESSURE: 131 MMHG | DIASTOLIC BLOOD PRESSURE: 85 MMHG | WEIGHT: 293 LBS | TEMPERATURE: 97.8 F | RESPIRATION RATE: 18 BRPM | BODY MASS INDEX: 51.91 KG/M2 | HEIGHT: 63 IN | OXYGEN SATURATION: 98 %

## 2018-09-11 DIAGNOSIS — K58.9 IRRITABLE BOWEL SYNDROME, UNSPECIFIED TYPE: ICD-10-CM

## 2018-09-11 DIAGNOSIS — E28.2 PCOS (POLYCYSTIC OVARIAN SYNDROME): ICD-10-CM

## 2018-09-11 DIAGNOSIS — K59.00 CONSTIPATION, UNSPECIFIED CONSTIPATION TYPE: Primary | ICD-10-CM

## 2018-09-11 DIAGNOSIS — G43.909 MIGRAINE WITHOUT STATUS MIGRAINOSUS, NOT INTRACTABLE, UNSPECIFIED MIGRAINE TYPE: ICD-10-CM

## 2018-09-11 DIAGNOSIS — K60.2 ANAL FISSURE: ICD-10-CM

## 2018-09-11 DIAGNOSIS — F33.9 RECURRENT DEPRESSION (HCC): ICD-10-CM

## 2018-09-11 NOTE — PROGRESS NOTES
Exam room [address] [place] [address] Chetan Lewis is a 29 y.o. female    Chief Complaint   Patient presents with    Constipation     per patient it goes back and forth    Diarrhea     per patient it goes back and forth     1. Have you been to the ER, urgent care clinic since your last visit? Hospitalized since your last visit? No    2. Have you seen or consulted any other health care providers outside of the [address] [place] [address] since your last visit? Include any pap smears or colon screening.  No     Health Maintenance Due   Topic Date Due    Influenza Age 5 to Adult  08/01/2018

## 2018-09-11 NOTE — MR AVS SNAPSHOT
[address] [facility] [place] [address] 
[phone] Patient: Brad Suazo MRN: [id number] MLA:[id number] Visit Information Date & Time Provider Department Dept. Phone Encounter #  
 9/11/2018  2:30 PM Km Keen MD [facility] [phone] [id number] Follow-up Instructions Return in about 1 month (around 10/11/2018), or if symptoms worsen or fail to improve, for wellness visit. Upcoming Health Maintenance Date Due Influenza Age 5 to Adult 8/1/2018 PAP AKA CERVICAL CYTOLOGY 7/24/2020 DTaP/Tdap/Td series (2 - Td) 7/10/2025 Allergies as of 9/11/2018  Review Complete On: 9/11/2018 By: Km Keen MD  
  
 Severity Noted Reaction Type Reactions Topamax [Topiramate]  10/16/2015    Other (comments) Suicidal thoughts Current Immunizations  Reviewed on 1/9/2018 Name Date Influenza Vaccine 10/17/2017, 10/11/2016, 10/2/2015, 10/15/2014, 10/11/2013 Influenza Vaccine Split 10/5/2011 Tdap 7/10/2015 Not reviewed this visit You Were Diagnosed With   
  
 Codes Comments Constipation, unspecified constipation type    -  Primary ICD-10-CM: K59.00 ICD-9-CM: 564.00 Irritable bowel syndrome, unspecified type     ICD-10-CM: K58.9 ICD-9-CM: 768.2 PCOS (polycystic ovarian syndrome)     ICD-10-CM: E28.2 ICD-9-CM: 256.4 Recurrent depression ([facility] [address]     ICD-10-CM: F33.9 ICD-9-CM: 296.30 Migraine without status migrainosus, not intractable, unspecified migraine type     ICD-10-CM: G43.909 ICD-9-CM: 346.90 Anal fissure     ICD-10-CM: K60.2 ICD-9-CM: 565.0 Vitals BP Pulse Temp Resp Height(growth percentile) Weight(growth percentile) 131/85 (BP 1 Location: Left arm, BP Patient Position: Sitting) 88 97.8 °F (36.6 °C) (Oral) 18 5' 2.5\" (1.588 m) 300 lb (136.1 kg) LMP SpO2 BMI OB Status Smoking Status 07/05/2018 98% 54 kg/m2 Having regular periods Never Smoker Vitals History BMI and BSA Data Body Mass Index Body Surface Area 54 kg/m 2 2.45 m 2 Preferred Pharmacy Pharmacy Name Phone [id number] [address] [facility] [phone] Your Updated Medication List  
  
   
This list is accurate as of 9/11/18  4:00 PM.  Always use your most recent med list.  
  
  
  
  
 ATIVAN 0.5 mg tablet Generic drug:  LORazepam  
Take 0.5 mg by mouth. Take one half  
  
 cholecalciferol 1,000 unit tablet Commonly known as:  VITAMIN D3 Take 1 tablet by mouth daily. clindamycin 1 % topical gel Commonly known as:  CLINDAGEL Apply  to affected area two (2) times a day. use thin film on affected area x several days prn  
  
 diclofenac 1 % Gel Commonly known as:  VOLTAREN Apply 2 g to affected area four (4) times daily as needed. LITHIUM CARBONATE PO Take  by mouth.  
  
 metFORMIN  mg tablet Commonly known as:  GLUCOPHAGE XR  
TAKE 4 TABLETS DAILY WITH DINNER Omeprazole delayed release 20 mg tablet Commonly known as:  PRILOSEC D/R Take 1 Tab by mouth daily. PROBIOTIC 4X 10-15 mg Tbec Generic drug:  B.infantis-B.ani-B.long-B.bifi Take  by mouth. sertraline 100 mg tablet Commonly known as:  ZOLOFT Take 1 Tab by mouth daily. Follow-up Instructions Return in about 1 month (around 10/11/2018), or if symptoms worsen or fail to improve, for wellness visit. To-Do List   
 09/12/2018 Imaging:  XR ABD (KUB) Introducing [facility]! Dear Tonio Encarnacion: Thank you for requesting a [employer] account. Our records indicate that you already have an active [employer] account. You can access your account anytime at [email or web address] Did you know that you can access your hospital and ER discharge instructions at any time in [employer]? You can also review all of your test results from your hospital stay or ER visit. Additional Information If you have questions, please visit the Frequently Asked Questions section of the [employer] website at [email or web address] Remember, [employer] is NOT to be used for urgent needs. For medical emergencies, dial 911. Now available from your iPhone and Android! Please provide this summary of care documentation to your next provider. Your primary care clinician is listed as [address] If you have any questions after today's visit, please call [phone].

## 2018-09-11 NOTE — PROGRESS NOTES
HPI:  Presents for f/u constipation, bowel habits    BMs at this point are once a day    Final 2 weeks of masters degree   A lot of stress. Major problem is anal fissure and re-exacerbating it. Pt with chronic lower back pain episodes   +benefit from chiropractor adjustments  Bouts of it   Seems to correlate with menses   Worse just prior to menses. Past medical, Social, and Family history reviewed    Prior to Admission medications    Medication Sig Start Date End Date Taking? Authorizing Provider   diclofenac (VOLTAREN) 1 % gel Apply 2 g to affected area four (4) times daily as needed. 7/24/18  Yes Zoltan Proctor MD   LORazepam (ATIVAN) 0.5 mg tablet Take 0.5 mg by mouth. Take one half   Yes Historical Provider   LITHIUM CARBONATE PO Take  by mouth. Yes Historical Provider   B.infantis-B.ani-B.long-B.bifi (PROBIOTIC 4X) 10-15 mg TbEC Take  by mouth. Yes Historical Provider   cholecalciferol (VITAMIN D3) 1,000 unit tablet Take 1 tablet by mouth daily. 12/17/14  Yes Zoltan Proctor MD   metFORMIN ER (GLUCOPHAGE XR) 500 mg tablet TAKE 4 TABLETS DAILY WITH DINNER 11/20/14  Yes Zoltan Proctor MD   sertraline (ZOLOFT) 100 mg tablet Take 1 Tab by mouth daily. 7/16/14  Yes Zoltan Proctor MD   Omeprazole delayed release (PRILOSEC D/R) 20 mg tablet Take 1 Tab by mouth daily. 5/28/18   Zoltan Proctor MD   clindamycin (CLINDAGEL) 1 % topical gel Apply  to affected area two (2) times a day. use thin film on affected area x several days prn 4/12/18   Zoltan Proctor MD          ROS  Complete ROS reviewed and negative or stable except as noted in HPI. Physical Exam   Constitutional: She is oriented to person, place, and time. She appears well-nourished. No distress. Obese    HENT:   Head: Normocephalic and atraumatic. Eyes: EOM are normal. Pupils are equal, round, and reactive to light. No scleral icterus. Neck: Normal range of motion. Neck supple.    Cardiovascular: Normal rate, regular rhythm and normal heart sounds. Exam reveals no gallop and no friction rub. No murmur heard. Pulmonary/Chest: Effort normal and breath sounds normal. No respiratory distress. She has no wheezes. She has no rales. Abdominal: Soft. Bowel sounds are normal. She exhibits no distension. There is no tenderness. Obese    Musculoskeletal: Normal range of motion. She exhibits no edema. Neurological: She is alert and oriented to person, place, and time. She exhibits normal muscle tone. Skin: Skin is warm. No rash noted. Psychiatric: She has a normal mood and affect. Nursing note and vitals reviewed. Prior labs reviewed. Assessment/Plan:  ?underlying constipation that has never fully resolved  Likely complicated by IBS    [id number] ICD-9-CM    1. Constipation, unspecified constipation type K59.00 564.00 XR ABD (KUB)   2. Irritable bowel syndrome, unspecified type K58.9 564.1    3. PCOS (polycystic ovarian syndrome) E28.2 256.4    4. Recurrent depression (HCC) F33.9 296.30    5. Migraine without status migrainosus, not intractable, unspecified migraine type G43.909 346.90    6. Anal fissure K60.2 565.0      Follow-up Disposition:  Return in about 1 month (around 10/11/2018), or if symptoms worsen or fail to improve, for wellness visit. results and schedule of future studies reviewed with patient  reviewed diet, exercise and weight   cardiovascular risk and specific lipid/LDL goals reviewed  reviewed medications and side effects in detail   KUB  Reviewed bowel regimen  If bowel impaction is not the issue, then consider IBS med(s)  O/w reviewed fiber and balancing bowel regimen.   Reviewed hormonal influences and possible pathophysiology   Agree with PT       >40 minutes time spent with >50% in counseling and coordination of care

## 2018-10-12 LAB
HBA1C MFR BLD HPLC: 5.2 %
LDL-C, EXTERNAL: 132

## 2018-10-31 ENCOUNTER — OFFICE VISIT (OUTPATIENT)
Dept: INTERNAL MEDICINE CLINIC | Age: 33
End: 2018-10-31

## 2018-10-31 VITALS
TEMPERATURE: 98.8 F | RESPIRATION RATE: 16 BRPM | BODY MASS INDEX: 51.91 KG/M2 | OXYGEN SATURATION: 100 % | HEART RATE: 85 BPM | SYSTOLIC BLOOD PRESSURE: 118 MMHG | HEIGHT: 63 IN | DIASTOLIC BLOOD PRESSURE: 79 MMHG | WEIGHT: 293 LBS

## 2018-10-31 DIAGNOSIS — E28.2 PCOS (POLYCYSTIC OVARIAN SYNDROME): ICD-10-CM

## 2018-10-31 DIAGNOSIS — Z00.00 WELL ADULT EXAM: Primary | ICD-10-CM

## 2018-10-31 DIAGNOSIS — F41.9 ANXIETY: ICD-10-CM

## 2018-10-31 DIAGNOSIS — K64.9 HEMORRHOIDS, UNSPECIFIED HEMORRHOID TYPE: ICD-10-CM

## 2018-10-31 DIAGNOSIS — K58.9 IRRITABLE BOWEL SYNDROME, UNSPECIFIED TYPE: ICD-10-CM

## 2018-10-31 DIAGNOSIS — M54.50 CHRONIC MIDLINE LOW BACK PAIN WITHOUT SCIATICA: ICD-10-CM

## 2018-10-31 DIAGNOSIS — G89.29 CHRONIC MIDLINE LOW BACK PAIN WITHOUT SCIATICA: ICD-10-CM

## 2018-10-31 NOTE — PROGRESS NOTES
HPI:  Presents for f/u wellness, IBS, etc.    Pt reports GI sx improved after school and thesis was over    CP yest - seen at [facility]   EKG - done - NL    Described as chest pressure and throat discomfort as well  Took ativan to calm down  Started when on the way to an eye exam appt   Ativan seemed to help some, but did not resolve it  No SOB or ABAD    Pt attributes pain to a recent chiropractic adjustment    Pain actually felt better when ambulating rather than worsening with exertion. Recent eye gtts assoc with eye staph    Low back pain   Often related to menses  Considered endometriosis - GYN NP declined     Pt did not tolerate danya - emotional side effects    PAP last July 2017 - told 5 yr f/u (neg PAP + neg HPV)    Has had a flu shot    Past medical, Social, and Family history reviewed    Prior to Admission medications    Medication Sig Start Date End Date Taking? Authorizing Provider   LORazepam (ATIVAN) 0.5 mg tablet Take 0.5 mg by mouth. Take one half   Yes Provider, Historical   LITHIUM CARBONATE PO Take 300 mg by mouth. Yes Provider, Historical   B.infantis-B.ani-B.long-B.bifi (PROBIOTIC 4X) 10-15 mg TbEC Take  by mouth. Yes Provider, Historical   cholecalciferol (VITAMIN D3) 1,000 unit tablet Take 1 tablet by mouth daily. 12/17/14  Yes Romayne Neve, MD   metFORMIN ER (GLUCOPHAGE XR) 500 mg tablet TAKE 4 TABLETS DAILY WITH DINNER 11/20/14  Yes Romayne Neve, MD   sertraline (ZOLOFT) 100 mg tablet Take 1 Tab by mouth daily. 7/16/14  Yes Romayne Neve, MD   diclofenac (VOLTAREN) 1 % gel Apply 2 g to affected area four (4) times daily as needed. 7/24/18   Romayne Neve, MD   Omeprazole delayed release (PRILOSEC D/R) 20 mg tablet Take 1 Tab by mouth daily. 5/28/18   Romayne Neve, MD   clindamycin (CLINDAGEL) 1 % topical gel Apply  to affected area two (2) times a day.  use thin film on affected area x several days prn 4/12/18   Romayne Neve, MD          ROS  Complete ROS reviewed and negative or stable except as noted in HPI. Physical Exam   Constitutional: She is oriented to person, place, and time. She appears well-nourished. No distress. Obese    HENT:   Head: Normocephalic and atraumatic. Eyes: EOM are normal. Pupils are equal, round, and reactive to light. No scleral icterus. Neck: Normal range of motion. Neck supple. Cardiovascular: Normal rate, regular rhythm and normal heart sounds. Exam reveals no gallop and no friction rub. No murmur heard. Pulmonary/Chest: Effort normal and breath sounds normal. No respiratory distress. She has no wheezes. She has no rales. Abdominal: Soft. Bowel sounds are normal. She exhibits no distension. There is no tenderness. Obese    Musculoskeletal: Normal range of motion. She exhibits no edema. Neurological: She is alert and oriented to person, place, and time. She exhibits normal muscle tone. Skin: Skin is warm. No rash noted. Psychiatric: She has a normal mood and affect. Nursing note and vitals reviewed. Prior labs reviewed. EKG - reviewed myself - NL      Assessment/Plan:    ICD-10-CM ICD-9-CM    1. Well adult exam Z00.00 V70.0    2. Hemorrhoids, unspecified hemorrhoid type K64.9 455.6    3. Irritable bowel syndrome, unspecified type K58.9 564.1    4. PCOS (polycystic ovarian syndrome) E28.2 256.4    5. Anxiety F41.9 300.00    6. Chronic midline low back pain without sciatica M54.5 724.2     G89.29 338.29      Follow-up Disposition:  Return in about 6 months (around 4/30/2019), or if symptoms worsen or fail to improve, for GI, PCOS.    results and schedule of future studies reviewed with patient  reviewed diet, exercise and weight   cardiovascular risk and specific lipid/LDL goals reviewed  reviewed medications and side effects in detail   Reviewed GERD and esophageal spasm  Reviewed endometriosis dx, management  Has appt with NP at pelvic health clinic   Consider diagnostic laparoscopy and potential endometrial ablation tx   Pt to consider OCP - low dose as well

## 2018-10-31 NOTE — PROGRESS NOTES
Rm#[id number]  Chief Complaint   Patient presents with    Well Woman    GI Problem     1 month f/u for constipation and diarrhea. pt states that its better      1. Have you been to the ER, urgent care clinic since your last visit? Hospitalized since your last visit? Yes [facility] [address] 10-30-18, chest pain     2. Have you seen or consulted any other health care providers outside of the [facility] since your last visit? Include any pap smears or colon screening. Yes BRITTANY julian, PT, chiropractor, endocrin.   There are no preventive care reminders to display for this patient.

## 2018-11-26 DIAGNOSIS — G43.909 MIGRAINE WITHOUT STATUS MIGRAINOSUS, NOT INTRACTABLE, UNSPECIFIED MIGRAINE TYPE: ICD-10-CM

## 2018-11-26 RX ORDER — SUMATRIPTAN 50 MG/1
50 TABLET, FILM COATED ORAL
Qty: 12 TAB | Refills: 2 | Status: SHIPPED | OUTPATIENT
Start: 2018-11-26 | End: 2019-02-18 | Stop reason: ALTCHOICE

## 2018-11-26 RX ORDER — BUTALBITAL, ACETAMINOPHEN AND CAFFEINE 50; 325; 40 MG/1; MG/1; MG/1
1 TABLET ORAL
Qty: 20 TAB | Refills: 1 | Status: SHIPPED | OUTPATIENT
Start: 2018-11-26 | End: 2021-05-17

## 2019-02-15 DIAGNOSIS — G43.909 MIGRAINE WITHOUT STATUS MIGRAINOSUS, NOT INTRACTABLE, UNSPECIFIED MIGRAINE TYPE: Primary | ICD-10-CM

## 2019-02-15 RX ORDER — RIZATRIPTAN BENZOATE 10 MG/1
10 TABLET ORAL
Qty: 10 TAB | Refills: 2 | Status: SHIPPED | OUTPATIENT
Start: 2019-02-15 | End: 2019-02-15

## 2019-02-15 RX ORDER — AMITRIPTYLINE HYDROCHLORIDE 25 MG/1
25 TABLET, FILM COATED ORAL
Qty: 30 TAB | Refills: 5 | Status: SHIPPED | OUTPATIENT
Start: 2019-02-15 | End: 2019-02-18

## 2019-02-18 ENCOUNTER — OFFICE VISIT (OUTPATIENT)
Dept: INTERNAL MEDICINE CLINIC | Age: 34
End: 2019-02-18

## 2019-02-18 VITALS
TEMPERATURE: 99 F | HEIGHT: 63 IN | DIASTOLIC BLOOD PRESSURE: 88 MMHG | WEIGHT: 289.4 LBS | HEART RATE: 91 BPM | SYSTOLIC BLOOD PRESSURE: 120 MMHG | OXYGEN SATURATION: 98 % | RESPIRATION RATE: 16 BRPM | BODY MASS INDEX: 51.28 KG/M2

## 2019-02-18 DIAGNOSIS — E28.2 PCOS (POLYCYSTIC OVARIAN SYNDROME): ICD-10-CM

## 2019-02-18 DIAGNOSIS — G43.109 COMPLICATED MIGRAINE: ICD-10-CM

## 2019-02-18 DIAGNOSIS — F41.9 ANXIETY: ICD-10-CM

## 2019-02-18 DIAGNOSIS — F33.9 RECURRENT DEPRESSION (HCC): ICD-10-CM

## 2019-02-18 DIAGNOSIS — G43.909 MIGRAINE WITHOUT STATUS MIGRAINOSUS, NOT INTRACTABLE, UNSPECIFIED MIGRAINE TYPE: Primary | ICD-10-CM

## 2019-02-18 DIAGNOSIS — G47.33 OSA (OBSTRUCTIVE SLEEP APNEA): ICD-10-CM

## 2019-02-18 NOTE — PROGRESS NOTES
HPI:  Presents for f/u Migraines, etc    Pt reports \"scary\" migraines - first was 11/25/18  Paresthesias - UE and face, mouth. See at [facility] - they dx sx as Buspar related. But, HA developed later  I thought the episode to be more likely complex migraine. I Rx'd imitrex. Historically pt had associated her migraines to her menstrual cycles. Menses started 11/16/18 and was completed prior to 11/25/18 episode    In December, pt had a menstrual period 12/18/18 then 1 week later had a migraine with aura. Using primrose oil supplement - started in November  Attempting to improve menstrual regularity. Has had another episode of UE then facial numbness in January    Then, PA at [facility] suggested the possibility of MS and considering ER visit with neuro-imaging. They checked B12 - 314 )    Pt has been anxious as a result of these symptoms   Pt's psychiatrist has increased zoloft to 150 mg and on scheduled benzos    Pt acknowledges heightened awareness of all subtle symptoms. This makes her more anxious. Past medical, Social, and Family history reviewed    Prior to Admission medications    Medication Sig Start Date End Date Taking? Authorizing Provider   MULTIVITAMIN PO  11/28/18  Yes Provider, Historical   MAGNESIUM GLYCINATE PO Take 400 mg by mouth. Yes Provider, Historical   CANNABIDIOL, CBD, EXTRACT PO Take 15 mg by mouth. Yes Provider, Historical   LORazepam (ATIVAN) 0.5 mg tablet Take 0.5 mg by mouth. Take one half   Yes Provider, Historical   LITHIUM CARBONATE PO Take 300 mg by mouth. Yes Provider, Historical   cholecalciferol (VITAMIN D3) 1,000 unit tablet Take 1 tablet by mouth daily. 12/17/14  Yes Elif Bee MD   metFORMIN ER (GLUCOPHAGE XR) 500 mg tablet TAKE 4 TABLETS DAILY WITH DINNER 11/20/14  Yes Elif Bee MD   sertraline (ZOLOFT) 100 mg tablet Take 1 Tab by mouth daily.  7/16/14  Yes Elif Bee MD   amitriptyline (ELAVIL) 25 mg tablet Take 1 Tab by mouth nightly. 2/15/19   Elif Bee MD   butalbital-acetaminophen-caffeine (FIORICET, ESGIC) -40 mg per tablet Take 1 Tab by mouth every six (6) hours as needed for Headache. 11/26/18   Elif Bee MD   diclofenac (VOLTAREN) 1 % gel Apply 2 g to affected area four (4) times daily as needed. 7/24/18   Elif Bee MD   Omeprazole delayed release (PRILOSEC D/R) 20 mg tablet Take 1 Tab by mouth daily. 5/28/18   Elif Bee MD   clindamycin (CLINDAGEL) 1 % topical gel Apply  to affected area two (2) times a day. use thin film on affected area x several days prn 4/12/18   Elif Bee MD   B.infantis-B.ani-B.long-B.bifi (PROBIOTIC 4X) 10-15 mg TbEC Take  by mouth. Provider, Historical          ROS  Complete ROS reviewed and negative or stable except as noted in HPI. Physical Exam   Constitutional: She is oriented to person, place, and time. She appears well-nourished. No distress. Obese    HENT:   Head: Normocephalic and atraumatic. Eyes: EOM are normal. Pupils are equal, round, and reactive to light. No scleral icterus. Neck: Normal range of motion. Neck supple. Cardiovascular: Normal rate, regular rhythm and normal heart sounds. Exam reveals no gallop and no friction rub. No murmur heard. Pulmonary/Chest: Effort normal and breath sounds normal. No respiratory distress. She has no wheezes. She has no rales. Abdominal: Soft. Bowel sounds are normal. She exhibits no distension. There is no tenderness. Obese    Musculoskeletal: Normal range of motion. She exhibits no edema. Neurological: She is alert and oriented to person, place, and time. She exhibits normal muscle tone. Skin: Skin is warm. No rash noted. Psychiatric: She has a normal mood and affect. Nursing note and vitals reviewed. Prior labs reviewed. Reviewed prior imaging reports      Assessment/Plan:  Complex migraine    ICD-10-CM ICD-9-CM    1.  Migraine without status migrainosus, not intractable, unspecified migraine type G43.909 346.90    2. Complicated migraine U04.184 346.00    3. Recurrent depression (HCC) F33.9 296.30    4. DENISE (obstructive sleep apnea) G47.33 327.23    5. Anxiety F41.9 300.00    6. PCOS (polycystic ovarian syndrome) E28.2 256.4      Follow-up Disposition:  Return in about 3 months (around 5/18/2019) for migraine, neurologist follow up. results and schedule of future studies reviewed with patient  reviewed diet, exercise and weight  cardiovascular risk and specific lipid/LDL goals reviewed  reviewed medications and side effects in detail   Consider B12 supplement . See neuro as scheduled at [facility]  Consider repeat brain MRI. Agree to dc primrose oil  Change abortive med to Godfrey Freeman on proph med for now - but consider elavil.         >40 minutes time spent with >50% in counseling and coordination of care

## 2019-02-18 NOTE — PROGRESS NOTES
Lavell Jacobo is a 29 y.o. female    Chief Complaint   Patient presents with    Migraine     follow up     1. Have you been to the ER, urgent care clinic since your last visit? Hospitalized since your last visit? Pt [facility]st  [facility] 2/14/19 migraine    2. Have you seen or consulted any other health care providers outside of the [address] [place] [address] since your last visit? Include any pap smears or colon screening.   no

## 2019-04-12 LAB
HBA1C MFR BLD HPLC: 5.2 %
LDL-C, EXTERNAL: 139

## 2019-05-20 ENCOUNTER — OFFICE VISIT (OUTPATIENT)
Dept: INTERNAL MEDICINE CLINIC | Age: 34
End: 2019-05-20

## 2019-05-20 VITALS
TEMPERATURE: 98.4 F | WEIGHT: 282.8 LBS | HEIGHT: 63 IN | HEART RATE: 88 BPM | DIASTOLIC BLOOD PRESSURE: 75 MMHG | OXYGEN SATURATION: 99 % | BODY MASS INDEX: 50.11 KG/M2 | SYSTOLIC BLOOD PRESSURE: 112 MMHG | RESPIRATION RATE: 16 BRPM

## 2019-05-20 DIAGNOSIS — I67.9 SMALL VESSEL DISEASE, CEREBROVASCULAR: Primary | ICD-10-CM

## 2019-05-20 DIAGNOSIS — B36.0 TINEA VERSICOLOR: ICD-10-CM

## 2019-05-20 DIAGNOSIS — G47.33 OSA (OBSTRUCTIVE SLEEP APNEA): ICD-10-CM

## 2019-05-20 DIAGNOSIS — E28.2 PCOS (POLYCYSTIC OVARIAN SYNDROME): ICD-10-CM

## 2019-05-20 DIAGNOSIS — E66.01 OBESITY, MORBID (HCC): ICD-10-CM

## 2019-05-20 DIAGNOSIS — F33.9 RECURRENT DEPRESSION (HCC): ICD-10-CM

## 2019-05-20 DIAGNOSIS — L98.9 SKIN LESIONS: ICD-10-CM

## 2019-05-20 DIAGNOSIS — G43.909 MIGRAINE WITHOUT STATUS MIGRAINOSUS, NOT INTRACTABLE, UNSPECIFIED MIGRAINE TYPE: ICD-10-CM

## 2019-05-20 RX ORDER — KETOCONAZOLE 20 MG/G
CREAM TOPICAL 2 TIMES DAILY
Qty: 30 G | Refills: 1 | Status: SHIPPED | OUTPATIENT
Start: 2019-05-20

## 2019-05-20 NOTE — PROGRESS NOTES
Room 14    Patient presents alone. Chief Complaint   Patient presents with    Migraine     f/u; has not had migraine since February    Skin Problem     spots on chest area       1. Have you been to the ER, urgent care clinic since your last visit? Hospitalized since your last visit? No    2. Have you seen or consulted any other health care providers outside of the [facility] since your last visit? Include any pap smears or colon screening. Yes When: 4/24/19 Where: Dr. Madhu Blount Reason for visit: Psychiatry    There are no preventive care reminders to display for this patient.

## 2019-05-20 NOTE — PROGRESS NOTES
HPI:  Presents for f/u neuro eval, skin concerns, etc    [facility]  [facility] with white matter dz  LP last week - some results are back  MRI of T and C spine this coming weekend    Very few migraines recently - none in past 3 months    Amenorrhea from January through May 9th  Had consider provera to induce menses - was not needed. Pt had alternative medicine procedure that may have helped stimulate menses. On a mediteranean diet  Has lost some weight. Past medical, Social, and Family history reviewed    Prior to Admission medications    Medication Sig Start Date End Date Taking? Authorizing Provider   ketoconazole (NIZORAL) 2 % topical cream Apply  to affected area two (2) times a day. 5/20/19  Yes Evie Carlos MD   MULTIVITAMIN PO  11/28/18  Yes Provider, Historical   MAGNESIUM GLYCINATE PO Take 400 mg by mouth. Yes Provider, Historical   CANNABIDIOL, CBD, EXTRACT PO Take 15 mg by mouth. Yes Provider, Historical   LORazepam (ATIVAN) 0.5 mg tablet Take 0.5 mg by mouth. Take one half   Yes Provider, Historical   LITHIUM CARBONATE PO Take 300 mg by mouth. Yes Provider, Historical   cholecalciferol (VITAMIN D3) 1,000 unit tablet Take 1 tablet by mouth daily. 12/17/14  Yes Evie Carlos MD   metFORMIN ER (GLUCOPHAGE XR) 500 mg tablet TAKE 4 TABLETS DAILY WITH DINNER 11/20/14  Yes Evie Carlos MD   sertraline (ZOLOFT) 100 mg tablet Take 1 Tab by mouth daily. 7/16/14  Yes Evie Carlos MD   butalbital-acetaminophen-caffeine (FIORICET, ESGIC) -40 mg per tablet Take 1 Tab by mouth every six (6) hours as needed for Headache. 11/26/18   Evie Carlos MD   diclofenac (VOLTAREN) 1 % gel Apply 2 g to affected area four (4) times daily as needed. 7/24/18   Evie Carlos MD   Omeprazole delayed release (PRILOSEC D/R) 20 mg tablet Take 1 Tab by mouth daily. 5/28/18   Evie Carlos MD   clindamycin (CLINDAGEL) 1 % topical gel Apply  to affected area two (2) times a day. use thin film on affected area x several days prn 4/12/18   MD CIRILO Feliz.infantis-B.ani-B.long-B.bifi (PROBIOTIC 4X) 10-15 mg TbEC Take  by mouth. Provider, Historical          ROS  Complete ROS reviewed and negative or stable except as noted in HPI. Physical Exam   Constitutional: She is oriented to person, place, and time. She appears well-nourished. No distress. Obese    HENT:   Head: Normocephalic and atraumatic. Eyes: Pupils are equal, round, and reactive to light. EOM are normal. No scleral icterus. Neck: Normal range of motion. Neck supple. Cardiovascular: Normal rate, regular rhythm and normal heart sounds. Exam reveals no gallop and no friction rub. No murmur heard. Pulmonary/Chest: Effort normal and breath sounds normal. No respiratory distress. She has no wheezes. She has no rales. Abdominal: Soft. Bowel sounds are normal. She exhibits no distension. There is no tenderness. Obese    Musculoskeletal: Normal range of motion. She exhibits no edema. Neurological: She is alert and oriented to person, place, and time. She exhibits normal muscle tone. Skin: Skin is warm. No rash noted. Skin spots appear to be post inflammatory changes only, no active lesions. Psychiatric: She has a normal mood and affect. Nursing note and vitals reviewed. Prior labs reviewed. Reviewed prior imaging reports      Assessment/Plan:    ICD-10-CM ICD-9-CM    1. Small vessel disease, cerebrovascular I67.9 437.9 ANNA W/REFLEX      RA + CCP ABS      SED RATE (ESR)      C REACTIVE PROTEIN, QT   2. Obesity, morbid ([address] E66.01 278.01    3. Skin lesions L98.9 709.9    4. Recurrent depression (HCC) F33.9 296.30    5. PCOS (polycystic ovarian syndrome) E28.2 256.4    6. DENISE (obstructive sleep apnea) G47.33 327.23    7. Migraine without status migrainosus, not intractable, unspecified migraine type G43.909 346.90    8.  Tinea versicolor B36.0 111.0 ketoconazole (NIZORAL) 2 % topical cream Follow-up and Dispositions    · Return in about 5 months (around 10/20/2019), or if symptoms worsen or fail to improve.         results and schedule of future studies reviewed with patient  reviewed diet, exercise and weight  cardiovascular risk and specific lipid/LDL goals reviewed  reviewed medications and side effects in detail  Consider serologies  Follow with neuro   Reviewed diet and lipid re-check in the Fall  Offered reassurance re: skin

## 2019-10-11 LAB
HBA1C MFR BLD HPLC: 5.2 %
LDL-C, EXTERNAL: 136

## 2019-10-21 ENCOUNTER — OFFICE VISIT (OUTPATIENT)
Dept: INTERNAL MEDICINE CLINIC | Age: 34
End: 2019-10-21

## 2019-10-21 VITALS
RESPIRATION RATE: 16 BRPM | TEMPERATURE: 99.1 F | OXYGEN SATURATION: 98 % | HEIGHT: 63 IN | WEIGHT: 288 LBS | HEART RATE: 76 BPM | DIASTOLIC BLOOD PRESSURE: 78 MMHG | SYSTOLIC BLOOD PRESSURE: 117 MMHG | BODY MASS INDEX: 51.03 KG/M2

## 2019-10-21 DIAGNOSIS — M54.50 CHRONIC MIDLINE LOW BACK PAIN WITHOUT SCIATICA: ICD-10-CM

## 2019-10-21 DIAGNOSIS — E28.2 PCOS (POLYCYSTIC OVARIAN SYNDROME): ICD-10-CM

## 2019-10-21 DIAGNOSIS — E78.5 HYPERLIPIDEMIA, UNSPECIFIED HYPERLIPIDEMIA TYPE: Primary | ICD-10-CM

## 2019-10-21 DIAGNOSIS — M76.32 IT BAND SYNDROME, LEFT: ICD-10-CM

## 2019-10-21 DIAGNOSIS — G43.909 MIGRAINE WITHOUT STATUS MIGRAINOSUS, NOT INTRACTABLE, UNSPECIFIED MIGRAINE TYPE: ICD-10-CM

## 2019-10-21 DIAGNOSIS — G89.29 CHRONIC MIDLINE LOW BACK PAIN WITHOUT SCIATICA: ICD-10-CM

## 2019-10-21 RX ORDER — ACETAMINOPHEN AND CODEINE PHOSPHATE 120; 12 MG/5ML; MG/5ML
SOLUTION ORAL
Refills: 3 | COMMUNITY
Start: 2019-10-13 | End: 2021-11-26 | Stop reason: ALTCHOICE

## 2019-10-21 NOTE — PROGRESS NOTES
Subjective:  
36 y.o. female for Well Woman Check. Her gyne and breast care is done elsewhere by her Ob-Gyne physician. {Choose one or more SmartLinks; press DELETE if none desired:[id number]} {Choose one or more Last Lab values; press DELETE if none desired:[id number]} ROS: Feeling generally well. No TIA's or unusual headaches, no dysphagia. No prolonged cough. No dyspnea or chest pain on exertion. No abdominal pain, change in bowel habits, black or bloody stools. No urinary tract symptoms. No new or unusual musculoskeletal symptoms. Specific concerns today: ***. Objective: The patient appears well, alert, oriented x 3, in no distress. Visit Vitals /78 (BP 1 Location: Right arm, BP Patient Position: Sitting) Pulse 76 Temp 99.1 °F (37.3 °C) (Oral) Resp 16 Ht 5' 2.5\" (1.588 m) Wt 288 lb (130.6 kg) LMP 09/16/2019 (Exact Date) SpO2 98% BMI 51.84 kg/m² ENT normal.  Neck supple. No adenopathy or thyromegaly. MIKHAIL. Lungs are clear, good air entry, no wheezes, rhonchi or rales. S1 and S2 normal, no murmurs, regular rate and rhythm. Abdomen soft without tenderness, guarding, mass or organomegaly. Extremities show no edema, normal peripheral pulses. Neurological is normal, no focal findings. Breast and Pelvic exams are deferred. Assessment/Plan:  
Well Woman 
{plan, general patient:307753} {Assessment and Plan:60486}

## 2019-10-21 NOTE — PROGRESS NOTES
#13  7-2019 BOTOX inj. In anus for anal fissure     Chief Complaint   Patient presents with    Other     5 month follow up      1. Have you been to the ER, urgent care clinic since your last visit? Hospitalized since your last visit? No    2. Have you seen or consulted any other health care providers outside of the [address] [place] [address] since your last visit? Include any pap smears or colon screening. Yes ENDOgregory np, 10-11-19;  GYN uday orr NP, 10-10-19;  THERLUIS clayton 10-7-19; NEURO carlyn Simon 9-30-19;  BRITTANY ARGUETA  9-25-19     There are no preventive care reminders to display for this patient.

## 2019-10-21 NOTE — PROGRESS NOTES
HPI:  Presents for f/u dysmenorrhea, back pain, etc    C/o premenstrual syndrome with pain as well as pain mid cycle  Mostly back pain. To start norethindrone - progestin based pill   Awaiting menses to start it. Pt addressed the possibility of endometriosis with her GYN NP and it was dismissed. Endocrine rec's transvaginal US to eval for ovarian cysts. Concerned re: validity of PCOS dx    Getting PT for back pain. +/- benefit - good days and bad. Left SI or hip area down to lateral left knee  Pt acknowledges c/w IT band. Past medical, Social, and Family history reviewed    Prior to Admission medications    Medication Sig Start Date End Date Taking? Authorizing Provider   ketoconazole (NIZORAL) 2 % topical cream Apply  to affected area two (2) times a day. 5/20/19  Yes Jodie Petty MD   MULTIVITAMIN PO  11/28/18  Yes Provider, Historical   MAGNESIUM GLYCINATE PO Take 400 mg by mouth. Indications: mag citrate   Yes Provider, Historical   LORazepam (ATIVAN) 0.5 mg tablet Take 0.5 mg by mouth. Take one half   Yes Provider, Historical   LITHIUM CARBONATE PO Take 300 mg by mouth. Yes Provider, Historical   cholecalciferol (VITAMIN D3) 1,000 unit tablet Take 1 tablet by mouth daily. 12/17/14  Yes Jodie Petty MD   metFORMIN ER (GLUCOPHAGE XR) 500 mg tablet TAKE 4 TABLETS DAILY WITH DINNER 11/20/14  Yes Jodie Petty MD   sertraline (ZOLOFT) 100 mg tablet Take 1 Tab by mouth daily. 7/16/14  Yes Jodie Petty MD   norethindrone (MICRONOR) 0.35 mg tab TAKE ONE TABLET BY MOUTH ONE TIME DAILY START ON SUNDAY OF NEXT MENSES 10/13/19   Provider, Historical   CANNABIDIOL, CBD, EXTRACT PO Take 15 mg by mouth. Provider, Historical   butalbital-acetaminophen-caffeine (FIORICET, ESGIC) -40 mg per tablet Take 1 Tab by mouth every six (6) hours as needed for Headache. 11/26/18   Jodie Petty MD   diclofenac (VOLTAREN) 1 % gel Apply 2 g to affected area four (4) times daily as needed. 7/24/18   Dipak Carrington MD   Omeprazole delayed release (PRILOSEC D/R) 20 mg tablet Take 1 Tab by mouth daily. 5/28/18   Dipak Carrington MD   clindamycin (CLINDAGEL) 1 % topical gel Apply  to affected area two (2) times a day. use thin film on affected area x several days prn 4/12/18   Dipak Carrington MD   B.infantis-B.ani-B.long-B.bifi (PROBIOTIC 4X) 10-15 mg TbEC Take  by mouth. Provider, Historical          ROS  Complete ROS reviewed and negative or stable except as noted in HPI. Physical Exam   Constitutional: She is oriented to person, place, and time. She appears well-nourished. No distress. Obese    HENT:   Head: Normocephalic and atraumatic. Eyes: Pupils are equal, round, and reactive to light. EOM are normal. No scleral icterus. Neck: Normal range of motion. Neck supple. Cardiovascular: Normal rate, regular rhythm and normal heart sounds. Exam reveals no gallop and no friction rub. No murmur heard. Pulmonary/Chest: Effort normal and breath sounds normal. No respiratory distress. She has no wheezes. She has no rales. Abdominal: Soft. Bowel sounds are normal. She exhibits no distension. There is no tenderness. Obese    Musculoskeletal: Normal range of motion. She exhibits no edema. Neurological: She is alert and oriented to person, place, and time. She exhibits normal muscle tone. Skin: Skin is warm. No rash noted. Psychiatric: She has a normal mood and affect. Nursing note and vitals reviewed. Prior labs reviewed from our record to include prior testing showing elevated testosterone levels for her. Reviewed recent  labs from [facility]       Assessment/Plan:  Concern for endometriosis contributing  Consider fibromyalgia component to pain as well. ICD-10-CM ICD-9-CM    1. Hyperlipidemia, unspecified hyperlipidemia type E78.5 272.4    2. PCOS (polycystic ovarian syndrome) E28.2 256.4    3.  Migraine without status migrainosus, not intractable, unspecified migraine type G43.909 346.90    4. Chronic midline low back pain without sciatica M54.5 724.2     G89.29 338.29    5. It band syndrome, left M76.32 728.89      Follow-up and Dispositions    · Return in about 6 months (around 4/21/2020), or if symptoms worsen or fail to improve, for cholesterol. results and schedule of future studies reviewed with patient  reviewed diet, exercise and weight   cardiovascular risk and specific lipid/LDL goals reviewed  reviewed medications and side effects in detail   Reviewed possible endometriosis and hormonal therapy  Agree with starting progestin based tx - may consider starting prior to menses if it remains irregular. Reviewed ultrasound - expectations  Continue PT  Consider neuropathic pain med.   Scheduled NSAID

## 2019-10-29 DIAGNOSIS — B36.0 TINEA VERSICOLOR: ICD-10-CM

## 2019-10-29 DIAGNOSIS — M54.30 SCIATICA, UNSPECIFIED LATERALITY: Primary | ICD-10-CM

## 2019-10-29 RX ORDER — TRAMADOL HYDROCHLORIDE 50 MG/1
50 TABLET ORAL
Qty: 28 TAB | Refills: 0 | Status: SHIPPED | OUTPATIENT
Start: 2019-10-29 | End: 2019-11-05

## 2020-05-29 DIAGNOSIS — E53.8 B12 DEFICIENCY: Primary | ICD-10-CM

## 2020-06-18 DIAGNOSIS — M26.609 TMJ (TEMPOROMANDIBULAR JOINT SYNDROME): Primary | ICD-10-CM

## 2020-06-18 DIAGNOSIS — F45.8 BRUXISM (TEETH GRINDING): ICD-10-CM

## 2020-07-20 ENCOUNTER — OFFICE VISIT (OUTPATIENT)
Dept: INTERNAL MEDICINE CLINIC | Age: 35
End: 2020-07-20

## 2020-07-20 VITALS
RESPIRATION RATE: 24 BRPM | TEMPERATURE: 99.2 F | BODY MASS INDEX: 51.91 KG/M2 | DIASTOLIC BLOOD PRESSURE: 77 MMHG | HEART RATE: 90 BPM | WEIGHT: 293 LBS | SYSTOLIC BLOOD PRESSURE: 112 MMHG | OXYGEN SATURATION: 99 % | HEIGHT: 63 IN

## 2020-07-20 DIAGNOSIS — G47.33 OSA (OBSTRUCTIVE SLEEP APNEA): ICD-10-CM

## 2020-07-20 DIAGNOSIS — Z00.00 WELL WOMAN EXAM (NO GYNECOLOGICAL EXAM): Primary | ICD-10-CM

## 2020-07-20 DIAGNOSIS — K58.9 IRRITABLE BOWEL SYNDROME, UNSPECIFIED TYPE: ICD-10-CM

## 2020-07-20 DIAGNOSIS — G43.909 MIGRAINE WITHOUT STATUS MIGRAINOSUS, NOT INTRACTABLE, UNSPECIFIED MIGRAINE TYPE: ICD-10-CM

## 2020-07-20 DIAGNOSIS — F41.9 ANXIETY: ICD-10-CM

## 2020-07-20 DIAGNOSIS — E28.2 PCOS (POLYCYSTIC OVARIAN SYNDROME): ICD-10-CM

## 2020-07-20 DIAGNOSIS — L98.9 SKIN LESIONS: ICD-10-CM

## 2020-07-20 DIAGNOSIS — E78.5 HYPERLIPIDEMIA, UNSPECIFIED HYPERLIPIDEMIA TYPE: ICD-10-CM

## 2020-07-20 DIAGNOSIS — F33.9 RECURRENT DEPRESSION (HCC): ICD-10-CM

## 2020-07-20 DIAGNOSIS — E66.01 OBESITY, MORBID (HCC): ICD-10-CM

## 2020-07-20 NOTE — PROGRESS NOTES
Subjective:   36 y.o. female for Well Woman Check. Her gyne and breast care is done elsewhere by her Ob-Gyne physician. Past medical, Social, and Family history reviewed  Medications reviewed and updated. ROS: Feeling generally well. No TIA's or unusual headaches, no dysphagia. No prolonged cough. No dyspnea or chest pain on exertion. No abdominal pain, change in bowel habits, black or bloody stools. No urinary tract symptoms. No new or unusual musculoskeletal symptoms. Specific concerns today:   Pt seeing therapist and psych  Pandemic has been stressful. Pt reports sleep in not ideal  Falls asleep, but awakens frequently  Seeing sleep med provider for DENISE   Now using autotitrating CPAP regularly  Also using a bite guard - has had some excess saliva at night. Pt acknowledges she could have better sleep hygiene. Pt having weird dreams    PT for TMJ x 1 session thus far. PT agrees that primarily muscular   Next session this weekend    Has GYN appt next month    Neuro at [facility] for [facility]    Pt has had some weight gain with pandemic  Heat is difficult to deal with in recent weeks. Pt reports low back pain assoc with ovulation  Then, fatigue and discomfort in pre-menstrual phase. Working through hormonal adjustment and OCPs - on progestin only      Objective: The patient appears well, alert, oriented x 3, in no distress. Visit Vitals  /77 (BP 1 Location: Right arm, BP Patient Position: Sitting)   Pulse 90   Temp 99.2 °F (37.3 °C) (Oral)   Resp 24   Ht 5' 2.5\" (1.588 m)   Wt 303 lb (137.4 kg)   LMP 07/08/2020 (Exact Date)   SpO2 99%   BMI 54.54 kg/m²     ENT normal.  Neck supple. No adenopathy or thyromegaly. MIKHAIL. Lungs are clear, good air entry, no wheezes, rhonchi or rales. S1 and S2 normal, no murmurs, regular rate and rhythm. Abdomen soft without tenderness, guarding, mass or organomegaly. Extremities show no edema, normal peripheral pulses.  Neurological is normal, no focal findings. Breast and Pelvic exams are deferred. Prior labs reviewed. Assessment/Plan:   Well Woman  follow low fat diet, routine labs ordered, call if any problems    ICD-10-CM ICD-9-CM    1. Well woman exam (no gynecological exam)  Z00.00 V70.0     [V70.0]   2. PCOS (polycystic ovarian syndrome)  E28.2 256.4    3. Migraine without status migrainosus, not intractable, unspecified migraine type  G43.909 346.90    4. Hyperlipidemia, unspecified hyperlipidemia type  E78.5 272.4    5. Obesity, morbid ([facility] [address]  E66.01 278.01    6. Skin lesions  L98.9 709.9    7. Recurrent depression (HCC)  F33.9 296.30    8. DENISE (obstructive sleep apnea)  G47.33 327.23    9. Anxiety  F41.9 300.00    10. Irritable bowel syndrome, unspecified type  K58.9 564.1      Follow-up and Dispositions    · Return in about 6 months (around 1/20/2021), or if symptoms worsen or fail to improve, for cholesterol - IO.       results and schedule of future studies reviewed with patient  reviewed diet, exercise and weight   cardiovascular risk and specific lipid/LDL goals reviewed  reviewed medications and side effects in detail     Offered encouragement with psych, PT, sleep, etc.  GYN for exam and PAP next month  Pt to consider HPV vaccine - pt to address with GYN.

## 2020-07-20 NOTE — PROGRESS NOTES
Rm#15  NON FASTING   Sent lab results through [facility]     Chief Complaint   Patient presents with    Well Woman    Cholesterol Problem     f/u      1. Have you been to the ER, urgent care clinic since your last visit? Hospitalized since your last visit? No    2. Have you seen or consulted any other health care providers outside of the [address] [place] [address] since your last visit? Include any pap smears or colon screening. Yes PT for TMJ-x1 weekly for 6 weeks,  [facility];  therapist, milvia adams, x2 monthly;  PSY, karis saxena, once monthly;  chiropractor x2 monthly, Manda ricci, Neuro, 3-2020, carlyn egan [facility], migrains;  eye spec. , [facility] , yearly follow up 6-2020;     OBGYN appt.  [facility] next month     Health Maintenance Due   Topic Date Due    PAP AKA CERVICAL CYTOLOGY  07/24/2020     3 most recent PHQ Screens 7/20/2020   PHQ Not Done Active Diagnosis of Depression or Bipolar Disorder   Little interest or pleasure in doing things -   Feeling down, depressed, irritable, or hopeless -   Total Score PHQ 2 -     Recent Travel Screening and Travel History documentation     Travel Screening      No screening recorded since 07/19/20 0000      Travel History   Travel since 06/20/20     No documented travel since 06/20/20

## 2020-09-22 DIAGNOSIS — R31.9 URINARY TRACT INFECTION WITH HEMATURIA, SITE UNSPECIFIED: Primary | ICD-10-CM

## 2020-09-22 DIAGNOSIS — N39.0 URINARY TRACT INFECTION WITH HEMATURIA, SITE UNSPECIFIED: Primary | ICD-10-CM

## 2020-09-24 ENCOUNTER — LAB ONLY (OUTPATIENT)
Dept: INTERNAL MEDICINE CLINIC | Age: 35
End: 2020-09-24

## 2020-09-24 DIAGNOSIS — R31.9 URINARY TRACT INFECTION WITH HEMATURIA, SITE UNSPECIFIED: ICD-10-CM

## 2020-09-24 DIAGNOSIS — N39.0 URINARY TRACT INFECTION WITH HEMATURIA, SITE UNSPECIFIED: ICD-10-CM

## 2020-09-24 LAB
APPEARANCE UR: ABNORMAL
BACTERIA URNS QL MICRO: ABNORMAL /HPF
BILIRUB UR QL: NEGATIVE
COLOR UR: ABNORMAL
EPITH CASTS URNS QL MICRO: ABNORMAL /LPF
GLUCOSE UR STRIP.AUTO-MCNC: NEGATIVE MG/DL
HGB UR QL STRIP: NEGATIVE
KETONES UR QL STRIP.AUTO: NEGATIVE MG/DL
LEUKOCYTE ESTERASE UR QL STRIP.AUTO: ABNORMAL
NITRITE UR QL STRIP.AUTO: NEGATIVE
PH UR STRIP: 6 [PH]
PROT UR STRIP-MCNC: NEGATIVE MG/DL
RBC #/AREA URNS HPF: ABNORMAL /HPF
SP GR UR REFRACTOMETRY: 1.01
UROBILINOGEN UR QL STRIP.AUTO: 0.2 EU/DL
WBC URNS QL MICRO: ABNORMAL /HPF

## 2020-09-25 LAB
BACTERIA SPEC CULT: NORMAL
CC UR VC: NORMAL
SERVICE CMNT-IMP: NORMAL

## 2020-09-28 NOTE — PROGRESS NOTES
Urine culture has grown mixed bacterial martha without one clear infectious pathogen. Urinalysis shows many epithelial cells which suggest the sample could be contaminated with skin or external cells and bacteria rather than a true bladder infection. So. If you continue to have symptoms, we can consider another empiric (best guess) antibiotic, but no definitive infection and choice of antibiotic can be obtained from these results. Let me know what symptoms, if any, you still have.

## 2021-02-02 DIAGNOSIS — K21.9 GASTROESOPHAGEAL REFLUX DISEASE, UNSPECIFIED WHETHER ESOPHAGITIS PRESENT: Primary | ICD-10-CM

## 2021-02-02 DIAGNOSIS — K21.9 GASTROESOPHAGEAL REFLUX DISEASE: ICD-10-CM

## 2021-02-02 RX ORDER — PHENOL/SODIUM PHENOLATE
20 AEROSOL, SPRAY (ML) MUCOUS MEMBRANE DAILY
Qty: 30 TAB | Refills: 5 | Status: SHIPPED | OUTPATIENT
Start: 2021-02-02 | End: 2021-11-24 | Stop reason: SDUPTHER

## 2021-02-09 DIAGNOSIS — N39.0 URINARY TRACT INFECTION WITH HEMATURIA, SITE UNSPECIFIED: Primary | ICD-10-CM

## 2021-02-09 DIAGNOSIS — R31.9 URINARY TRACT INFECTION WITH HEMATURIA, SITE UNSPECIFIED: Primary | ICD-10-CM

## 2021-02-11 ENCOUNTER — LAB ONLY (OUTPATIENT)
Dept: INTERNAL MEDICINE CLINIC | Age: 36
End: 2021-02-11

## 2021-02-11 DIAGNOSIS — N39.0 URINARY TRACT INFECTION WITH HEMATURIA, SITE UNSPECIFIED: ICD-10-CM

## 2021-02-11 DIAGNOSIS — R31.9 URINARY TRACT INFECTION WITH HEMATURIA, SITE UNSPECIFIED: ICD-10-CM

## 2021-02-13 LAB
APPEARANCE UR: ABNORMAL
BACTERIA #/AREA URNS HPF: ABNORMAL /[HPF]
BACTERIA UR CULT: NORMAL
BILIRUB UR QL STRIP: NEGATIVE
CASTS URNS QL MICRO: ABNORMAL /LPF
COLOR UR: YELLOW
CRYSTALS URNS MICRO: ABNORMAL
EPI CELLS #/AREA URNS HPF: >10 /HPF (ref 0–10)
GLUCOSE UR QL: NEGATIVE
HGB UR QL STRIP: ABNORMAL
KETONES UR QL STRIP: ABNORMAL
LEUKOCYTE ESTERASE UR QL STRIP: ABNORMAL
MICRO URNS: ABNORMAL
MUCOUS THREADS URNS QL MICRO: PRESENT
NITRITE UR QL STRIP: NEGATIVE
PH UR STRIP: 5 [PH] (ref 5–7.5)
PROT UR QL STRIP: NEGATIVE
RBC #/AREA URNS HPF: ABNORMAL /HPF (ref 0–2)
SP GR UR: 1.03 (ref 1–1.03)
UNIDENT CRYS URNS QL MICRO: PRESENT
UROBILINOGEN UR STRIP-MCNC: 0.2 MG/DL (ref 0.2–1)
WBC #/AREA URNS HPF: ABNORMAL /HPF (ref 0–5)

## 2021-02-14 NOTE — PROGRESS NOTES
No clear evidence of bladder infection. Only usual urogenital martha at low counts. Let me know your symptoms, but not clear indication that an antibiotic would help.

## 2021-02-16 DIAGNOSIS — R11.2 NAUSEA VOMITING AND DIARRHEA: ICD-10-CM

## 2021-02-16 DIAGNOSIS — R11.0 NAUSEA: Primary | ICD-10-CM

## 2021-02-16 DIAGNOSIS — R19.7 NAUSEA VOMITING AND DIARRHEA: ICD-10-CM

## 2021-02-16 RX ORDER — ONDANSETRON 8 MG/1
8 TABLET, ORALLY DISINTEGRATING ORAL
Qty: 30 TAB | Refills: 2 | Status: SHIPPED | OUTPATIENT
Start: 2021-02-16 | End: 2021-05-17

## 2021-04-14 ENCOUNTER — OFFICE VISIT (OUTPATIENT)
Dept: INTERNAL MEDICINE CLINIC | Age: 36
End: 2021-04-14
Payer: COMMERCIAL

## 2021-04-14 VITALS
TEMPERATURE: 97.2 F | WEIGHT: 293 LBS | HEART RATE: 81 BPM | OXYGEN SATURATION: 98 % | SYSTOLIC BLOOD PRESSURE: 130 MMHG | RESPIRATION RATE: 18 BRPM | DIASTOLIC BLOOD PRESSURE: 86 MMHG | BODY MASS INDEX: 51.91 KG/M2 | HEIGHT: 63 IN

## 2021-04-14 DIAGNOSIS — E78.5 HYPERLIPIDEMIA, UNSPECIFIED HYPERLIPIDEMIA TYPE: ICD-10-CM

## 2021-04-14 DIAGNOSIS — E28.2 PCOS (POLYCYSTIC OVARIAN SYNDROME): Primary | ICD-10-CM

## 2021-04-14 DIAGNOSIS — Z11.59 NEED FOR HEPATITIS C SCREENING TEST: ICD-10-CM

## 2021-04-14 DIAGNOSIS — N94.6 DYSMENORRHEA: ICD-10-CM

## 2021-04-14 DIAGNOSIS — E66.01 OBESITY, MORBID (HCC): ICD-10-CM

## 2021-04-14 DIAGNOSIS — N92.6 MENSTRUAL CYCLE PROBLEM: ICD-10-CM

## 2021-04-14 DIAGNOSIS — E55.9 VITAMIN D DEFICIENCY: ICD-10-CM

## 2021-04-14 DIAGNOSIS — G43.909 MIGRAINE WITHOUT STATUS MIGRAINOSUS, NOT INTRACTABLE, UNSPECIFIED MIGRAINE TYPE: ICD-10-CM

## 2021-04-14 DIAGNOSIS — F41.9 ANXIETY: ICD-10-CM

## 2021-04-14 PROCEDURE — 99214 OFFICE O/P EST MOD 30 MIN: CPT | Performed by: INTERNAL MEDICINE

## 2021-04-14 NOTE — PROGRESS NOTES
RM: 15    Chief Complaint   Patient presents with    Labs     PCOs maintenace    Menstrual Problem     illness     Skin Problem     pt states she's had an itchy face    Weight Management     pt presents with concerns about weight gain, loss and management      Visit Vitals  BP (!) 132/92 (BP 1 Location: Left upper arm, BP Patient Position: Sitting, BP Cuff Size: Large adult)   Pulse 81   Temp 97.2 °F (36.2 °C) (Temporal)   Resp 18   Ht 5' 2.5\" (1.588 m)   Wt 307 lb 2 oz (139.3 kg)   LMP 03/24/2021   SpO2 98%   BMI 55.28 kg/m²     Recent Travel Screening and Travel History documentation     Travel Screening     Question   Response    In the last month, have you been in contact with someone who was confirmed or suspected to have Coronavirus / COVID-19? No / Unsure    Have you had a COVID-19 viral test in the last 14 days? No    Do you have any of the following new or worsening symptoms? None of these    Have you traveled internationally or domestically in the last month? No      Travel History   Travel since 03/14/21     No documented travel since 03/14/21        3 most recent PHQ Screens 7/20/2020   PHQ Not Done Active Diagnosis of Depression or Bipolar Disorder   Little interest or pleasure in doing things -   Feeling down, depressed, irritable, or hopeless -   Total Score PHQ 2 -            1. Have you been to the ER, urgent care clinic since your last visit? Hospitalized since your last visit? No    2. Have you seen or consulted any other health care providers outside of the [address] [place] [address] since your last visit? Include any pap smears or colon screening.  No     Health Maintenance Due   Topic Date Due    Hepatitis C Screening  Never done    COVID-19 Vaccine (1) Never done    PAP AKA CERVICAL CYTOLOGY  07/24/2020        Learning Assessment 10/24/2017   PRIMARY LEARNER Patient   BARRIERS PRIMARY LEARNER NONE   PRIMARY LANGUAGE ENGLISH   LEARNER PREFERENCE PRIMARY DEMONSTRATION   ANSWERED BY patient   RELATIONSHIP SELF

## 2021-04-14 NOTE — PROGRESS NOTES
HPI:  established patient  Presents for f/u PCOS, weight, etc    Pt with PCOS    Pt on a progestin only OCP per GYN  No estrogen per neuro. Cyclic 2 weeks following menses feeling bad and achy  Seeing GYN. Face itchiness  Some sun exposure  Some new skin products     Trouble with weight management  Tried Noom stephany and program - information overload so dc'd it. Seeing a therapist regularly, but transitioned to another provider       Past medical, Social, and Family history reviewed    Prior to Admission medications    Medication Sig Start Date End Date Taking? Authorizing Provider   Omeprazole delayed release (PRILOSEC D/R) 20 mg tablet Take 1 Tab by mouth daily. 2/2/21  Yes Kaylan Wade MD   norethindrone (MICRONOR) 0.35 mg tab TAKE ONE TABLET BY MOUTH ONE TIME DAILY START ON SUNDAY OF NEXT MENSES 10/13/19  Yes Provider, Historical   ketoconazole (NIZORAL) 2 % topical cream Apply  to affected area two (2) times a day. 5/20/19  Yes Kaylan Wade MD   MAGNESIUM GLYCINATE PO Take 400 mg by mouth. Indications: mag citrate   Yes Provider, Historical   LORazepam (ATIVAN) 0.5 mg tablet Take 0.5 mg by mouth. Take one half   Yes Provider, Historical   cholecalciferol (VITAMIN D3) 1,000 unit tablet Take 1 tablet by mouth daily. 12/17/14  Yes Kaylan Wade MD   metFORMIN ER (GLUCOPHAGE XR) 500 mg tablet TAKE 4 TABLETS DAILY WITH DINNER 11/20/14  Yes Kaylan Wade MD   sertraline (ZOLOFT) 100 mg tablet Take 1 Tab by mouth daily. 7/16/14  Yes Kaylan Wade MD   ondansetron (ZOFRAN ODT) 8 mg disintegrating tablet Take 1 Tab by mouth every eight (8) hours as needed for Nausea. 2/16/21   Kaylan Wade MD   MULTIVITAMIN PO  11/28/18   Provider, Historical   CANNABIDIOL, CBD, EXTRACT PO Take 15 mg by mouth. Provider, Historical   butalbital-acetaminophen-caffeine (FIORICET, ESGIC) -40 mg per tablet Take 1 Tab by mouth every six (6) hours as needed for Headache.  11/26/18   Milly Downing MD MAURIZIO   diclofenac (VOLTAREN) 1 % gel Apply 2 g to affected area four (4) times daily as needed. 7/24/18   Goldie Mckeon MD   clindamycin (CLINDAGEL) 1 % topical gel Apply  to affected area two (2) times a day. use thin film on affected area x several days prn 4/12/18   Goldie Mckeon MD   LITHIUM CARBONATE PO Take 75 mg by mouth. Indications: 1/2 tab daily - 150mg    Provider, Historical   B.infantis-B.ani-B.long-B.bifi (PROBIOTIC 4X) 10-15 mg TbEC Take  by mouth. Provider, Historical          ROS  Complete ROS reviewed and negative or stable except as noted in HPI. Physical Exam  Vitals signs and nursing note reviewed. Constitutional:       General: She is not in acute distress. Comments: Obese    HENT:      Head: Normocephalic and atraumatic. Eyes:      General: No scleral icterus. Pupils: Pupils are equal, round, and reactive to light. Neck:      Musculoskeletal: Normal range of motion and neck supple. Cardiovascular:      Rate and Rhythm: Normal rate and regular rhythm. Heart sounds: Normal heart sounds. No murmur. No friction rub. No gallop. Pulmonary:      Effort: Pulmonary effort is normal. No respiratory distress. Breath sounds: Normal breath sounds. No wheezing or rales. Abdominal:      General: Bowel sounds are normal. There is no distension. Palpations: Abdomen is soft. Tenderness: There is no abdominal tenderness. Comments: Obese    Musculoskeletal: Normal range of motion. Skin:     General: Skin is warm. Findings: No rash. Neurological:      Mental Status: She is alert and oriented to person, place, and time. Motor: No abnormal muscle tone. Prior labs reviewed. Assessment/Plan:    ICD-10-CM ICD-9-CM    1. PCOS (polycystic ovarian syndrome)  E28.2 256.4 REFERRAL TO OBSTETRICS AND GYNECOLOGY      HEMOGLOBIN A1C WITH EAG   2.  Dysmenorrhea  N94.6 625.3 REFERRAL TO OBSTETRICS AND GYNECOLOGY   3. Menstrual cycle problem  N92.6 626.4 REFERRAL TO OBSTETRICS AND GYNECOLOGY      CBC WITH AUTOMATED DIFF      FSH AND LH      ESTRADIOL      T4, FREE      TSH 3RD GENERATION   4. Hyperlipidemia, unspecified hyperlipidemia type  E78.5 272.4 LIPID PANEL      METABOLIC PANEL, COMPREHENSIVE   5. Migraine without status migrainosus, not intractable, unspecified migraine type  G43.909 346.90    6. Obesity, morbid ([address]  E66.01 278.01    7. Anxiety  F41.9 300.00    8. Vitamin D deficiency  E55.9 268.9 VITAMIN D, 25 HYDROXY   9. Need for hepatitis C screening test  Z11.59 V73.89 HCV AB W/RFLX TO ESAU     Follow-up and Dispositions    · Return in about 3 months (around 7/14/2021), or if symptoms worsen or fail to improve, for weight, mood, etc..        results and schedule of future studies reviewed with patient  reviewed diet, exercise and weight  cardiovascular risk and specific lipid/LDL goals reviewed  reviewed medications and side effects in detail    consider change or second opinion from new GYN - given Dr Thuy Pineda info  rec'd finding an alternative outlet when stressed rather than food  Consider Contrave. Continue other current medications   Labs - return fasting      An electronic signature was used to authenticate this note.   -- Maxim Nunez MD

## 2021-04-29 ENCOUNTER — LAB ONLY (OUTPATIENT)
Dept: INTERNAL MEDICINE CLINIC | Age: 36
End: 2021-04-29

## 2021-04-29 DIAGNOSIS — E78.5 HYPERLIPIDEMIA, UNSPECIFIED HYPERLIPIDEMIA TYPE: ICD-10-CM

## 2021-04-29 DIAGNOSIS — N92.6 MENSTRUAL CYCLE PROBLEM: ICD-10-CM

## 2021-04-29 DIAGNOSIS — E55.9 VITAMIN D DEFICIENCY: ICD-10-CM

## 2021-04-29 DIAGNOSIS — E28.2 PCOS (POLYCYSTIC OVARIAN SYNDROME): ICD-10-CM

## 2021-04-29 DIAGNOSIS — Z11.59 NEED FOR HEPATITIS C SCREENING TEST: ICD-10-CM

## 2021-04-29 LAB
25(OH)D3 SERPL-MCNC: 12.2 NG/ML (ref 30–100)
ALBUMIN SERPL-MCNC: 4.1 G/DL (ref 3.5–5)
ALBUMIN/GLOB SERPL: 1.2 {RATIO} (ref 1.1–2.2)
ALP SERPL-CCNC: 62 U/L (ref 45–117)
ALT SERPL-CCNC: 26 U/L (ref 12–78)
ANION GAP SERPL CALC-SCNC: 8 MMOL/L (ref 5–15)
AST SERPL-CCNC: 12 U/L (ref 15–37)
BASOPHILS # BLD: 0 K/UL (ref 0–0.1)
BASOPHILS NFR BLD: 1 % (ref 0–1)
BILIRUB SERPL-MCNC: 0.6 MG/DL (ref 0.2–1)
BUN SERPL-MCNC: 12 MG/DL (ref 6–20)
BUN/CREAT SERPL: 15 (ref 12–20)
CALCIUM SERPL-MCNC: 9.5 MG/DL (ref 8.5–10.1)
CHLORIDE SERPL-SCNC: 106 MMOL/L (ref 97–108)
CHOLEST SERPL-MCNC: 214 MG/DL
CO2 SERPL-SCNC: 27 MMOL/L (ref 21–32)
CREAT SERPL-MCNC: 0.82 MG/DL (ref 0.55–1.02)
DIFFERENTIAL METHOD BLD: NORMAL
EOSINOPHIL # BLD: 0.1 K/UL (ref 0–0.4)
EOSINOPHIL NFR BLD: 1 % (ref 0–7)
ERYTHROCYTE [DISTWIDTH] IN BLOOD BY AUTOMATED COUNT: 13 % (ref 11.5–14.5)
EST. AVERAGE GLUCOSE BLD GHB EST-MCNC: 114 MG/DL
FSH SERPL-ACNC: 6.4 MIU/ML
GLOBULIN SER CALC-MCNC: 3.4 G/DL (ref 2–4)
GLUCOSE SERPL-MCNC: 86 MG/DL (ref 65–100)
HBA1C MFR BLD: 5.6 % (ref 4–5.6)
HCT VFR BLD AUTO: 45.2 % (ref 35–47)
HDLC SERPL-MCNC: 46 MG/DL
HDLC SERPL: 4.7 {RATIO} (ref 0–5)
HGB BLD-MCNC: 14 G/DL (ref 11.5–16)
IMM GRANULOCYTES # BLD AUTO: 0 K/UL (ref 0–0.04)
IMM GRANULOCYTES NFR BLD AUTO: 0 % (ref 0–0.5)
LDLC SERPL CALC-MCNC: 145.2 MG/DL (ref 0–100)
LH SERPL-ACNC: 10.1 MIU/ML
LIPID PROFILE,FLP: ABNORMAL
LYMPHOCYTES # BLD: 2.5 K/UL (ref 0.8–3.5)
LYMPHOCYTES NFR BLD: 37 % (ref 12–49)
MCH RBC QN AUTO: 29 PG (ref 26–34)
MCHC RBC AUTO-ENTMCNC: 31 G/DL (ref 30–36.5)
MCV RBC AUTO: 93.6 FL (ref 80–99)
MONOCYTES # BLD: 0.4 K/UL (ref 0–1)
MONOCYTES NFR BLD: 6 % (ref 5–13)
NEUTS SEG # BLD: 3.8 K/UL (ref 1.8–8)
NEUTS SEG NFR BLD: 55 % (ref 32–75)
NRBC # BLD: 0 K/UL (ref 0–0.01)
NRBC BLD-RTO: 0 PER 100 WBC
PLATELET # BLD AUTO: 230 K/UL (ref 150–400)
PMV BLD AUTO: 11 FL (ref 8.9–12.9)
POTASSIUM SERPL-SCNC: 4.3 MMOL/L (ref 3.5–5.1)
PROT SERPL-MCNC: 7.5 G/DL (ref 6.4–8.2)
RBC # BLD AUTO: 4.83 M/UL (ref 3.8–5.2)
SODIUM SERPL-SCNC: 141 MMOL/L (ref 136–145)
T4 FREE SERPL-MCNC: 1 NG/DL (ref 0.8–1.5)
TRIGL SERPL-MCNC: 114 MG/DL (ref ?–150)
TSH SERPL DL<=0.05 MIU/L-ACNC: 1.99 UIU/ML (ref 0.36–3.74)
VLDLC SERPL CALC-MCNC: 22.8 MG/DL
WBC # BLD AUTO: 6.9 K/UL (ref 3.6–11)

## 2021-04-30 DIAGNOSIS — E55.9 VITAMIN D DEFICIENCY: Primary | ICD-10-CM

## 2021-04-30 LAB
ESTRADIOL SERPL-MCNC: 37.7 PG/ML
HCV AB S/CO SERPL IA: <0.1 S/CO RATIO (ref 0–0.9)
HCV AB SERPL QL IA: NORMAL

## 2021-04-30 RX ORDER — ERGOCALCIFEROL 1.25 MG/1
50000 CAPSULE ORAL
Qty: 12 CAP | Refills: 0 | Status: SHIPPED | OUTPATIENT
Start: 2021-04-30 | End: 2021-07-17

## 2021-04-30 NOTE — PROGRESS NOTES
Vitamin D is low - take a once a week high dosed Rx'd vitamin D to boost levels - I sent Rx to [facility]. Also, increase your daily vitamin D long term by 1000 units per day to maintain good levels. LDL cholesterol has increased to 145. Low saturated fat along with exercise and weight loss will lower this. But, your genetics are pre-programmed for this and at some point I anticipate you would benefit from a statin medication. But, the other efforts have multiple benefits and should be tried first.  But, if the LDL stays up, we will need medication - can check again in a few months. HgbA1C is still in the normal range, though.   Other labs are either normal or stable and at goal.

## 2021-05-17 ENCOUNTER — OFFICE VISIT (OUTPATIENT)
Dept: OBGYN CLINIC | Age: 36
End: 2021-05-17
Payer: COMMERCIAL

## 2021-05-17 VITALS
BODY MASS INDEX: 51.91 KG/M2 | WEIGHT: 293 LBS | HEIGHT: 63 IN | SYSTOLIC BLOOD PRESSURE: 140 MMHG | DIASTOLIC BLOOD PRESSURE: 86 MMHG

## 2021-05-17 DIAGNOSIS — R11.0 NAUSEA: ICD-10-CM

## 2021-05-17 DIAGNOSIS — F32.81 PREMENSTRUAL DYSPHORIC DISORDER: ICD-10-CM

## 2021-05-17 DIAGNOSIS — E28.2 PCOS (POLYCYSTIC OVARIAN SYNDROME): Primary | ICD-10-CM

## 2021-05-17 DIAGNOSIS — M54.50 CHRONIC MIDLINE LOW BACK PAIN, UNSPECIFIED WHETHER SCIATICA PRESENT: ICD-10-CM

## 2021-05-17 DIAGNOSIS — G89.29 CHRONIC MIDLINE LOW BACK PAIN, UNSPECIFIED WHETHER SCIATICA PRESENT: ICD-10-CM

## 2021-05-17 DIAGNOSIS — R53.82 CHRONIC FATIGUE: ICD-10-CM

## 2021-05-17 PROCEDURE — 99204 OFFICE O/P NEW MOD 45 MIN: CPT | Performed by: OBSTETRICS & GYNECOLOGY

## 2021-05-17 NOTE — PROGRESS NOTES
Problem Visit    Edson Hodge is a 30 y. o. with pmh of PCOS (diagnosed clinically by anovulatory cycles and polycystic appearance to ovaries) presenting for 2nd opinion. Patient w/ multiple symptoms including low back pain, PMS symptoms and extreme fatigue and nausea prior to menses. Previously saw Jessica Anderson at [address] [place] [address]. Pelvic US in 2019 showing normal pelvic structures. She was started on norethindrone 1 year ago, which initially helped with symptoms, but has not been helping for the past couple of months. Also frequently sees chiropractor for adjustments as she has had chronic low back pain for years, but wonders if it could be explained by endometriosis as it always seems worse prior to menses. Adjustments do help. She has also seen orthopedic spine doctors, had MRI showing some degenerative changes. She felt better for a period of time after PT and steroid injection of SI joint, reports this was \"the best she has felt\". Estrogen CI d/t multiple other comorbidities including HTN, migraines, etc. Pt is obese. She has been on metformin for management of insulin resistance and PCOS. She suffers from significant anxiety, depression and follows closely with psychiatry. She has also been worked up by neurologist (had some \"abnormal brain lesion\"), but reports MS was ruled out. Never sexually active. Also with occasional GERD, and diarrhea assoc with metformin. Ob/Gyn Hx:  G0   LMP-4/23/21  Menarche- 13  Menses- regular (on mini-pill), have been irregular in the past  Contraception-POP  STI- denies  ? SA-never    Health maintenance:  Pap-within last 5 years, normal per patient  Gardasil-denies    Past Medical History:   Diagnosis Date    Anxiety     Depression     [facility] Elevated blood pressure 5/31/2011    Hypertension     Irregular menses     Migraine 8/2/2011    Obesity     DENISE (obstructive sleep apnea)     cpap at 8    PCOS (polycystic ovarian syndrome)     endocrine Dr. Dang Arrington       Past Surgical History:   Procedure Laterality Date    HX HEENT      dental       Family History   Problem Relation Age of Onset    Diabetes Mother     Other Mother         obesity    Arthritis-osteo Mother     Diabetes Father     Hypertension Father     Other Father         obesity    Prostate Cancer Father     Heart Disease Maternal Grandmother     Stroke Maternal Grandfather     Other Brother         obesity       Social History     Socioeconomic History    Marital status: SINGLE     Spouse name: Not on file    Number of children: Not on file    Years of education: Not on file    Highest education level: Not on file   Occupational History    Not on file   Social Needs    Financial resource strain: Not on file    Food insecurity     Worry: Not on file     Inability: Not on file    Transportation needs     Medical: Not on file     Non-medical: Not on file   Tobacco Use    Smoking status: Never Smoker    Smokeless tobacco: Never Used   Substance and Sexual Activity    Alcohol use: Yes     Comment: rare    Drug use: No    Sexual activity: Never     Comment: Bith Conrol Pill used for acne tx   Lifestyle    Physical activity     Days per week: Not on file     Minutes per session: Not on file    Stress: Not on file   Relationships    Social connections     Talks on phone: Not on file     Gets together: Not on file     Attends Latter day service: Not on file     Active member of club or organization: Not on file     Attends meetings of clubs or organizations: Not on file     Relationship status: Not on file    Intimate partner violence     Fear of current or ex partner: Not on file     Emotionally abused: Not on file     Physically abused: Not on file     Forced sexual activity: Not on file   Other Topics Concern    Not on file   Social History Narrative    Not on file       Current Outpatient Medications   Medication Sig Dispense Refill    ergocalciferol (ERGOCALCIFEROL) 1,250 mcg (50,000 unit) capsule Take 1 Cap by mouth every seven (7) days for 12 doses. 12 Cap 0    Omeprazole delayed release (PRILOSEC D/R) 20 mg tablet Take 1 Tab by mouth daily. 30 Tab 5    norethindrone (MICRONOR) 0.35 mg tab TAKE ONE TABLET BY MOUTH ONE TIME DAILY START ON SUNDAY OF NEXT MENSES  3    ketoconazole (NIZORAL) 2 % topical cream Apply  to affected area two (2) times a day. 30 g 1    LORazepam (ATIVAN) 0.5 mg tablet Take 0.5 mg by mouth. Take one half      metFORMIN ER (GLUCOPHAGE XR) 500 mg tablet TAKE 4 TABLETS DAILY WITH DINNER 120 tablet 11    sertraline (ZOLOFT) 100 mg tablet Take 1 Tab by mouth daily. 90 Tab 1    ondansetron (ZOFRAN ODT) 8 mg disintegrating tablet Take 1 Tab by mouth every eight (8) hours as needed for Nausea. 30 Tab 2    MULTIVITAMIN PO       MAGNESIUM GLYCINATE PO Take 400 mg by mouth. Indications: mag citrate      CANNABIDIOL, CBD, EXTRACT PO Take 15 mg by mouth.  butalbital-acetaminophen-caffeine (FIORICET, ESGIC) -40 mg per tablet Take 1 Tab by mouth every six (6) hours as needed for Headache. 20 Tab 1    diclofenac (VOLTAREN) 1 % gel Apply 2 g to affected area four (4) times daily as needed. 100 g 5    clindamycin (CLINDAGEL) 1 % topical gel Apply  to affected area two (2) times a day. use thin film on affected area x several days prn 60 g 2    LITHIUM CARBONATE PO Take 75 mg by mouth. Indications: 1/2 tab daily - 150mg      B.infantis-B.ani-B.long-B.bifi (PROBIOTIC 4X) 10-15 mg TbEC Take  by mouth.  cholecalciferol (VITAMIN D3) 1,000 unit tablet Take 1 tablet by mouth daily.  30 tablet 11       Allergies   Allergen Reactions    Topamax [Topiramate] Other (comments)     Suicidal thoughts       Review of Systems - History obtained from the patient  Constitutional: negative for weight loss, fever, night sweats  HEENT: negative for hearing loss, earache, congestion, snoring, sorethroat  CV: negative for chest pain, palpitations, edema  Resp: negative for cough, shortness of breath, wheezing  GI: negative for change in bowel habits, abdominal pain, black or bloody stools  : negative for frequency, dysuria, hematuria, vaginal discharge  MSK: negative for back pain, joint pain, muscle pain  Breast: negative for breast lumps, nipple discharge, galactorrhea  Skin :negative for itching, rash, hives  Neuro: negative for dizziness, headache, confusion, weakness  Psych: negative for anxiety, depression, change in mood  Heme/lymph: negative for bleeding, bruising, pallor    Physical Exam    Visit Vitals  BP (!) 140/86   Ht 5' 2.5\" (1.588 m)   Wt 309 lb (140.2 kg)   LMP 04/23/2021   BMI 55.62 kg/m²       Constitutional  · Appearance: well-nourished, well developed, alert, in no acute distress    HENT  · Head and Face: appears normal    Neck  · Inspection/Palpation: normal appearance, no masses or tenderness  · Lymph Nodes: no lymphadenopathy present  · Thyroid: gland size normal, nontender, no nodules or masses present on palpation    Chest  · Respiratory Effort: non-labored breathing  · Auscultation: CTAB, normal breath sounds    Cardiovascular  · Heart:  · Auscultation: regular rate and rhythm without murmur  · Extremities: no peripheral edema    Gastrointestinal  · Abdominal Examination: abdomen non-tender to palpation, normal bowel sounds, no masses present  · Liver and spleen: no hepatomegaly present, spleen not palpable  · Hernias: no hernias identified    Genitourinary --> declined exam today, prefers to do this at next visit    Skin  · General Inspection: no rash, no lesions identified    Neurologic/Psychiatric  · Mental Status:  · Orientation: grossly oriented to person, place and time  · Mood and Affect: mood normal, affect appropriate      Assessment/Plan:  30 y.o. G0 w/ multiple symptoms including low back pain, PMS symptoms and extreme fatigue and nausea prior to menses.  Discussed favor MSK cause of LBP given history and context, but cannot exclude endometriosis or hormonally mediated symptoms. -TVUS referral  -continue norethindrone for now (estrogen CI d/t HTN, migraine, etc)  -discussed possible trial of Orilissa --> but extensively reviewed that it can cause mood changes and discussed there was one reported suicide, pt will discuss this medication with her psychiatrist before we proceed further  -encourage bowel regimen   -encourage ongoing management with ortho/spine.  -encourage healthy diet, exercise, weight loss, etc    RTC 1 month for US and follow up, or sooner prn    Carol Wallace MD  5/17/2021  4:47 PM       Billing: Total time of visit = 45 min  >50% of time was spent in direct face-to-face counseling with patient.

## 2021-05-17 NOTE — PATIENT INSTRUCTIONS
Premenstrual Syndrome (PMS): Care Instructions  Overview     Premenstrual syndrome (PMS) is the name for some uncomfortable changes that can happen to women at a certain time of the month. This is the time between when your body releases an egg (ovulation) and the first days of your period. Doctors don't know why some women have PMS and others don't. They also don't know why some women have worse symptoms than others. There are different symptoms of PMS. You may have bloating or muscle aches. You may also feel meraz or have trouble sleeping. Some women crave certain foods. Any of these symptoms can get in the way of how well you feel. They may also affect your relationships, or your work or school. Home treatments and medicines can help you feel better. Follow-up care is a key part of your treatment and safety. Be sure to make and go to all appointments, and call your doctor if you are having problems. It's also a good idea to know your test results and keep a list of the medicines you take. How can you care for yourself at home? · Take anti-inflammatory medicines for body aches and breast tenderness. These include ibuprofen (Advil, Motrin) and naproxen (Aleve). Read and follow all instructions on the label. · Take your medicines exactly as prescribed. Call your doctor if you think you are having a problem with your medicine. · Cut down on or avoid caffeine, chocolate, and salt. Do this while you have PMS or several days before you might have symptoms. · Eat a healthy, balanced diet. Get plenty of water, fruits, vegetables, and whole grains. · Get plenty of exercise every day. Go for a walk or jog, ride your bike, or play sports. · Talk to your doctor about taking calcium and magnesium supplements. These may help relieve PMS symptoms. When should you call for help?    Call your doctor now or seek immediate medical care if:    · You have severe vaginal bleeding.     · You have new or worse belly or pelvic pain. Watch closely for changes in your health, and be sure to contact your doctor if:    · You have unusual vaginal bleeding.     · You do not get better as expected. Where can you learn more? Go to http://www.gray.com/  Enter Y188 in the search box to learn more about \"Premenstrual Syndrome (PMS): Care Instructions. \"  Current as of: July 17, 2020               Content Version: 12.8  © 2006-2021 [employer]. Care instructions adapted under license by [employer] (which disclaims liability or warranty for this information). If you have questions about a medical condition or this instruction, always ask your healthcare professional. [place] [address] any warranty or liability for your use of this information.

## 2021-06-01 DIAGNOSIS — R21 RASH: Primary | ICD-10-CM

## 2021-06-01 RX ORDER — TRIAMCINOLONE ACETONIDE 1 MG/G
CREAM TOPICAL 2 TIMES DAILY
Qty: 30 G | Refills: 1 | Status: SHIPPED | OUTPATIENT
Start: 2021-06-01

## 2021-06-15 ENCOUNTER — OFFICE VISIT (OUTPATIENT)
Dept: OBGYN CLINIC | Age: 36
End: 2021-06-15

## 2021-06-15 ENCOUNTER — HOSPITAL ENCOUNTER (OUTPATIENT)
Dept: LAB | Age: 36
Discharge: HOME | End: 2021-06-15
Payer: COMMERCIAL

## 2021-06-15 VITALS
DIASTOLIC BLOOD PRESSURE: 86 MMHG | SYSTOLIC BLOOD PRESSURE: 126 MMHG | BODY MASS INDEX: 51.91 KG/M2 | WEIGHT: 293 LBS | HEIGHT: 63 IN

## 2021-06-15 DIAGNOSIS — R53.83 FATIGUE, UNSPECIFIED TYPE: ICD-10-CM

## 2021-06-15 DIAGNOSIS — N64.4 MASTALGIA: ICD-10-CM

## 2021-06-15 DIAGNOSIS — R52 BODY ACHES: ICD-10-CM

## 2021-06-15 DIAGNOSIS — R11.0 NAUSEA: ICD-10-CM

## 2021-06-15 DIAGNOSIS — R10.2 PELVIC PAIN IN FEMALE: ICD-10-CM

## 2021-06-15 DIAGNOSIS — Z01.419 WELL WOMAN EXAM: Primary | ICD-10-CM

## 2021-06-15 DIAGNOSIS — Z11.51 SCREENING FOR HUMAN PAPILLOMAVIRUS: ICD-10-CM

## 2021-06-15 DIAGNOSIS — Z01.419 ENCOUNTER FOR GYNECOLOGICAL EXAMINATION WITHOUT ABNORMAL FINDING: ICD-10-CM

## 2021-06-15 DIAGNOSIS — M54.9 BACK PAIN, UNSPECIFIED BACK LOCATION, UNSPECIFIED BACK PAIN LATERALITY, UNSPECIFIED CHRONICITY: ICD-10-CM

## 2021-06-15 PROCEDURE — 87624 HPV HI-RISK TYP POOLED RSLT: CPT

## 2021-06-15 PROCEDURE — 99395 PREV VISIT EST AGE 18-39: CPT | Performed by: OBSTETRICS & GYNECOLOGY

## 2021-06-15 NOTE — PATIENT INSTRUCTIONS
Pelvic Pain: Care Instructions  Your Care Instructions     Pelvic pain, or pain in the lower belly, can have many causes. Often pelvic pain is not serious and gets better in a few days. If your pain continues or gets worse, you may need tests and treatment. Tell your doctor about any new symptoms. These may be signs of a serious problem. Follow-up care is a key part of your treatment and safety. Be sure to make and go to all appointments, and call your doctor if you are having problems. It's also a good idea to know your test results and keep a list of the medicines you take. How can you care for yourself at home? · Rest until you feel better. Lie down, and raise your legs by placing a pillow under your knees. · Drink plenty of fluids. You may find that small, frequent sips are easier on your stomach than if you drink a lot at once. Avoid drinks with carbonation or caffeine, such as soda pop, tea, or coffee. · Try eating several small meals instead of 2 or 3 large ones. Eat mild foods, such as rice, dry toast or crackers, bananas, and applesauce. Avoid fatty and spicy foods, other fruits, and alcohol until 48 hours after your symptoms have gone away. · Take an over-the-counter pain medicine, such as acetaminophen (Tylenol), ibuprofen (Advil, Motrin), or naproxen (Aleve). Read and follow all instructions on the label. · Do not take two or more pain medicines at the same time unless the doctor told you to. Many pain medicines have acetaminophen, which is Tylenol. Too much acetaminophen (Tylenol) can be harmful. · You can put a heating pad, a warm cloth, or moist heat on your belly to relieve pain. When should you call for help? Call your doctor now or seek immediate medical care if:    · You have a new or higher fever.     · You have unusual vaginal bleeding.     · You have new or worse belly or pelvic pain.     · You have vaginal discharge that has increased in amount or smells bad.    Watch closely for changes in your health, and be sure to contact your doctor if:    · You do not get better as expected. Where can you learn more? Go to http://www.gray.com/  Enter B514 in the search box to learn more about \"Pelvic Pain: Care Instructions. \"  Current as of: July 17, 2020               Content Version: 12.8  © 0003-1807 [employer]. Care instructions adapted under license by [employer] (which disclaims liability or warranty for this information). If you have questions about a medical condition or this instruction, always ask your healthcare professional. [place] [address] any warranty or liability for your use of this information.

## 2021-06-15 NOTE — PROGRESS NOTES
Problem Visit    Eli Sampson is a 30 y. o. with pmh of PCOS (diagnosed clinically by anovulatory cycles and polycystic appearance to ovaries) presenting for ultrasound and follow up. Patient w/ multiple symptoms including low back pain, PMS symptoms, body aches, extreme fatigue and nausea prior to menses. Unchanged since prior visit. Previously saw Rolando Simmons at [facility]. Pelvic US in 2019 showing normal pelvic structures. She was started on norethindrone 1 year ago, which initially helped with symptoms, but has not been helping for the past couple of months. Also frequently sees chiropractor for adjustments as she has had chronic low back pain for years, but wonders if it could be explained by endometriosis as it always seems worse prior to menses. Adjustments do help. She has also seen orthopedic spine doctors, had MRI showing some degenerative changes. She felt better for a period of time after PT and steroid injection of SI joint, reports this was \"the best she has felt\". Estrogen CI d/t multiple other comorbidities including HTN, migraines, etc. Pt is obese. She has been on metformin for management of insulin resistance and PCOS. She suffers from significant anxiety, depression and follows closely with psychiatry (who did not think trial of Giovanni Hue was a good idea for her mental health). She has also been worked up by neurologist (had some \"abnormal brain lesion\"), but reports MS was ruled out. Never sexually active. Also with occasional GERD, and diarrhea assoc with metformin. TV ULTRASOUND 6/15/21  THE UTERUS IS ANTEVERTED, NORMAL IN SIZE AND ECHOGENICITY. THE ENDOMETRIUM MEASURES 9.0MM IN THICKNESS. NO MASSES OR ABNORMALITIES ARE SEEN. RIGHT OVARY APPEARS ENLARGED WITH MULTIPLE FOLLICLES SEEN ON THE PERIPHERY. LEFT OVARY APPEARS WNL. NO FREE FLUID IS SEEN IN THE CDS.     Ob/Gyn Hx:  G0   LMP-5/21/21  Menarche- 13  Menses- regular (on mini-pill), have been irregular in the past  Contraception-POP  STI- denies  ? SA-never    Health maintenance:  Pap-within last 5 years, normal per patient  Gardasil-denies    Past Medical History:   Diagnosis Date    Anxiety     Depression     [employer] [facility] 5/[facility]/2011    Hypertension     Irregular menses     Migraine 8/2/2011    Obesity     DENISE (obstructive sleep apnea)     cpap at 8    PCOS (polycystic ovarian syndrome)     endocrine Dr. Brandon Spencer       Past Surgical History:   Procedure Laterality Date    HX HEENT      dental       Family History   Problem Relation Age of Onset    Diabetes Mother     Other Mother         obesity    Arthritis-osteo Mother     Diabetes Father     Hypertension Father     Other Father         obesity    Prostate Cancer Father     Heart Disease Maternal Grandmother     Stroke Maternal Grandfather     Other Brother         obesity       Social History     Socioeconomic History    Marital status: SINGLE     Spouse name: Not on file    Number of children: Not on file    Years of education: Not on file    Highest education level: Not on file   Occupational History    Not on file   Tobacco Use    Smoking status: Never Smoker    Smokeless tobacco: Never Used   Substance and Sexual Activity    Alcohol use: Yes     Comment: rare    Drug use: No    Sexual activity: Never     Comment: Bith Conrol Pill used for acne tx   Other Topics Concern    Not on file   Social History Narrative    Not on file     Social Determinants of Health     Financial Resource Strain:     Difficulty of Paying Living Expenses:    Food Insecurity:     Worried About Running Out of Food in the Last Year:     [address] in the Last Year:    Transportation Needs:     Lack of Transportation (Medical):      Lack of Transportation (Non-Medical):    Physical Activity:     Days of Exercise per Week:     Minutes of Exercise per Session:    Stress:     Feeling of Stress :    Social Connections:     Frequency of Communication with Friends and Family:     Frequency of Social Gatherings with Friends and Family:     Attends [employer] Services:     Active Member of Clubs or Organizations:     Attends Club or Organization Meetings:     Marital Status:    Intimate Partner Violence:     Fear of Current or Ex-Partner:     Emotionally Abused:     Physically Abused:     Sexually Abused:        Current Outpatient Medications   Medication Sig Dispense Refill    triamcinolone acetonide (KENALOG) 0.1 % topical cream Apply  to affected area two (2) times a day. use thin layer x 3-5 days 30 g 1    ergocalciferol (ERGOCALCIFEROL) 1,250 mcg (50,000 unit) capsule Take 1 Cap by mouth every seven (7) days for 12 doses. 12 Cap 0    Omeprazole delayed release (PRILOSEC D/R) 20 mg tablet Take 1 Tab by mouth daily. 30 Tab 5    norethindrone (MICRONOR) 0.35 mg tab TAKE ONE TABLET BY MOUTH ONE TIME DAILY START ON SUNDAY OF NEXT MENSES  3    ketoconazole (NIZORAL) 2 % topical cream Apply  to affected area two (2) times a day. 30 g 1    LORazepam (ATIVAN) 0.5 mg tablet Take 0.5 mg by mouth. Take one half      metFORMIN ER (GLUCOPHAGE XR) 500 mg tablet TAKE 4 TABLETS DAILY WITH DINNER 120 tablet 11    sertraline (ZOLOFT) 100 mg tablet Take 1 Tab by mouth daily.  90 Tab 1       Allergies   Allergen Reactions    Topamax [Topiramate] Other (comments)     Suicidal thoughts       Review of Systems - History obtained from the patient  Constitutional: negative for weight loss, fever, night sweats, +fatigue  HEENT: negative for hearing loss, earache, congestion, snoring, sorethroat  CV: negative for chest pain, palpitations, edema  Resp: negative for cough, shortness of breath, wheezing  GI: negative for change in bowel habits, abdominal pain, black or bloody stools, +nausea  : negative for frequency, dysuria, hematuria, vaginal discharge, +pelvic/back pain  MSK: negative for back pain, joint pain, muscle pain  Breast: negative for breast lumps, nipple discharge, galactorrhea, +left breast tenderness  Skin :negative for itching, rash, hives  Neuro: negative for dizziness, headache, confusion, weakness  Psych: negative for anxiety, depression, change in mood  Heme/lymph: negative for bleeding, bruising, pallor    Physical Exam  Visit Vitals  /86   Ht 5' 2.5\" (1.588 m)   Wt 310 lb (140.6 kg)   LMP 05/21/2021   BMI 55.80 kg/m²     PHYSICAL EXAMINATION    Constitutional  · Appearance: well-nourished, well developed, alert, in no acute distress, +morbidly obese    HENT  · Head and Face: appears normal    Neck  · Inspection/Palpation: normal appearance, no masses or tenderness  · Lymph Nodes: no lymphadenopathy present  · Thyroid: gland size normal, nontender, no nodules or masses present on palpation    Chest  · Respiratory Effort: breathing non-labored  · Auscultation: normal breath sounds    Cardiovascular  · Heart:  · Auscultation: regular rate and rhythm without murmur    Breasts  · Inspection of Breasts: breasts symmetrical, no skin changes, no discharge present, nipple appearance normal, no skin retraction present  · Palpation of Breasts and Axillae: no masses present on palpation, no breast tenderness  · Axillary Lymph Nodes: no lymphadenopathy present    Gastrointestinal  · Abdominal Examination: abdomen non-tender to palpation, normal bowel sounds, no masses present, +obesity limiting assessment  · Liver and spleen: no hepatomegaly present, spleen not palpable  · Hernias: no hernias identified    Genitourinary *use long Billy speculum for exam*  · External Genitalia: normal appearance for age, no discharge present, no tenderness present, no inflammatory lesions present, no masses present, no atrophy present  · Vagina: normal vaginal vault without central or paravaginal defects, no discharge present, no inflammatory lesions present, no masses present  · Bladder: non-tender to palpation  · Urethra: appears normal  · Cervix: normal   · Uterus: normal size, shape and consistency  · Adnexa: no adnexal tenderness present, no adnexal masses present  · Perineum: perineum within normal limits, no evidence of trauma, no rashes or skin lesions present  · Anus: anus within normal limits, no hemorrhoids present  · Inguinal Lymph Nodes: no lymphadenopathy present    Skin  · General Inspection: no rash, no lesions identified    Neurologic/Psychiatric  · Mental Status:  · Orientation: grossly oriented to person, place and time  · Mood and Affect: mood normal, affect appropriate    Assessment/Plan:  30 y.o. G0 presenting for AE and for follow up of  multiple symptoms including low back pain, body aches, PMS symptoms, extreme fatigue, nausea and breast pain prior to menses. Discussed favor MSK cause of LBP given history and context, but cannot exclude endometriosis or hormonally mediated symptoms.  Also cannot exclude a more systemic cause like fibromyalgia, etc.     Health maintenance:  -diet/exercise/healthy lifestyle/weight loss  -pap/HPV today  -STI screen   -gardasil series   -refer for mammography  -refer for colonoscopy  -refer for dexa scan    Problem visit:  -reviewed TVUS findings with pt today, reassurance of normal appearing structures, with exception of slight polycystic appearance to ovaries  -continue norethindrone for now, also discussed other progestin only options, pt declines IUD/Depo/Nexplanon and has concerns about weight gain, open to more information on Slynd  -pt spoke with psychiatrist who does NOT recommend Halina Puckett due to concerns about exacerbating mood symptoms  -advised pt see internist or rheumatologist to discuss systemic causes of symptoms  -discussed possibility of diagnostic laparoscopy in future to evaluate for endometriosis if pelvic symptoms worsen, but at this time, most prominent and concerning symptoms are fatigue and nausea prior to menses, and do not believe that these would be helped by surgical intervention  -encourage ongoing management with ortho/spine  -encourage healthy diet, exercise, weight loss, etc    RTC 1 year for AE or sooner jose c Verde MD  6/15/2021  3:42 PM

## 2021-06-29 LAB
CYTOLOGIST CVX/VAG CYTO: NORMAL
CYTOLOGY CVX/VAG DOC THIN PREP: NORMAL
HPV APTIMA: NEGATIVE
Lab: NORMAL
PATH REPORT.FINAL DX SPEC: NORMAL
STAT OF ADQ CVX/VAG CYTO-IMP: NORMAL

## 2021-07-14 ENCOUNTER — OFFICE VISIT (OUTPATIENT)
Dept: INTERNAL MEDICINE CLINIC | Age: 36
End: 2021-07-14
Payer: COMMERCIAL

## 2021-07-14 VITALS
HEIGHT: 63 IN | WEIGHT: 293 LBS | OXYGEN SATURATION: 98 % | RESPIRATION RATE: 18 BRPM | DIASTOLIC BLOOD PRESSURE: 80 MMHG | BODY MASS INDEX: 51.91 KG/M2 | TEMPERATURE: 97.9 F | HEART RATE: 96 BPM | SYSTOLIC BLOOD PRESSURE: 121 MMHG

## 2021-07-14 DIAGNOSIS — G43.909 MIGRAINE WITHOUT STATUS MIGRAINOSUS, NOT INTRACTABLE, UNSPECIFIED MIGRAINE TYPE: Primary | ICD-10-CM

## 2021-07-14 DIAGNOSIS — F41.9 ANXIETY: ICD-10-CM

## 2021-07-14 DIAGNOSIS — E66.01 OBESITY, MORBID (HCC): ICD-10-CM

## 2021-07-14 DIAGNOSIS — K58.9 IRRITABLE BOWEL SYNDROME, UNSPECIFIED TYPE: ICD-10-CM

## 2021-07-14 DIAGNOSIS — E78.5 HYPERLIPIDEMIA, UNSPECIFIED HYPERLIPIDEMIA TYPE: ICD-10-CM

## 2021-07-14 DIAGNOSIS — E28.2 PCOS (POLYCYSTIC OVARIAN SYNDROME): ICD-10-CM

## 2021-07-14 DIAGNOSIS — Z78.9 ADVISED ABOUT MANAGEMENT OF WEIGHT: ICD-10-CM

## 2021-07-14 PROCEDURE — 99214 OFFICE O/P EST MOD 30 MIN: CPT | Performed by: INTERNAL MEDICINE

## 2021-07-14 RX ORDER — METRONIDAZOLE 7.5 MG/G
CREAM TOPICAL
COMMUNITY
Start: 2021-05-24

## 2021-07-14 RX ORDER — HYDROCORTISONE 25 MG/G
CREAM TOPICAL
COMMUNITY
Start: 2021-05-24

## 2021-07-14 NOTE — PROGRESS NOTES
HPI:  established patient  Presents for f/u weight, mood, etc    Still back and pelvic sx with menstrual cycles  Pt declined Orilissa (inducing amenorrhea) due to potential hormone issues. Psychiatric provider reviewed Contrave components. Pt reluctant to starting wellbutrin and naloxone. Pt has used some intermittent fasting   Gets some jitteriness - likely hypoglycemia    Recent nausea   Unclear source  Then loose BM, no emesis  No fever. Mild HA  Hx migraine with aura  +/- PND  No fever. Neuro appt in Sept 2021      Past medical, Social, and Family history reviewed    Prior to Admission medications    Medication Sig Start Date End Date Taking? Authorizing Provider   hydrocortisone (HYTONE) 2.5 % topical cream APPLY AS NEEDED FOR FLARES ON CHEST 5/24/21  Yes Provider, Historical   metroNIDAZOLE (METROCREAM) 0.75 % topical cream  5/24/21  Yes Provider, Historical   ergocalciferol (ERGOCALCIFEROL) 1,250 mcg (50,000 unit) capsule Take 1 Cap by mouth every seven (7) days for 12 doses. 4/30/21 7/17/21 Yes Franko Leiva MD   norethindrone (MICRONOR) 0.35 mg tab TAKE ONE TABLET BY MOUTH ONE TIME DAILY START ON SUNDAY OF NEXT MENSES 10/13/19  Yes Provider, Historical   ketoconazole (NIZORAL) 2 % topical cream Apply  to affected area two (2) times a day. 5/20/19  Yes Franko Leiva MD   LORazepam (ATIVAN) 0.5 mg tablet Take 0.5 mg by mouth. Take one half   Yes Provider, Historical   metFORMIN ER (GLUCOPHAGE XR) 500 mg tablet TAKE 4 TABLETS DAILY WITH DINNER 11/20/14  Yes Franko Leiva MD   sertraline (ZOLOFT) 100 mg tablet Take 1 Tab by mouth daily. 7/16/14  Yes Franko Leiva MD   triamcinolone acetonide (KENALOG) 0.1 % topical cream Apply  to affected area two (2) times a day. use thin layer x 3-5 days  Patient not taking: Reported on 7/14/2021 6/1/21   Franko Leiva MD   Omeprazole delayed release (PRILOSEC D/R) 20 mg tablet Take 1 Tab by mouth daily.   Patient not taking: Reported on 7/14/2021 2/2/21   Binta Peguero MD          ROS  Complete ROS reviewed and negative or stable except as noted in HPI. Physical Exam  Vitals and nursing note reviewed. Constitutional:       General: She is not in acute distress. Comments: Obese    HENT:      Head: Normocephalic and atraumatic. Eyes:      General: No scleral icterus. Pupils: Pupils are equal, round, and reactive to light. Cardiovascular:      Rate and Rhythm: Normal rate and regular rhythm. Heart sounds: Normal heart sounds. No murmur heard. No friction rub. No gallop. Pulmonary:      Effort: Pulmonary effort is normal. No respiratory distress. Breath sounds: Normal breath sounds. No wheezing or rales. Abdominal:      General: Bowel sounds are normal. There is no distension. Palpations: Abdomen is soft. Tenderness: There is no abdominal tenderness. Comments: Obese    Musculoskeletal:         General: Normal range of motion. Cervical back: Normal range of motion and neck supple. Skin:     General: Skin is warm. Findings: No rash. Neurological:      Mental Status: She is alert and oriented to person, place, and time. Motor: No abnormal muscle tone. Prior labs reviewed. Assessment/Plan:    Migraine vs sinus HA - most likely migraine      ICD-10-CM ICD-9-CM    1. Migraine without status migrainosus, not intractable, unspecified migraine type  G43.909 346.90    2. PCOS (polycystic ovarian syndrome)  E28.2 256.4    3. Obesity, morbid ([facility] [address]  E66.01 278.01    4. Anxiety  F41.9 300.00    5. Irritable bowel syndrome, unspecified type  K58.9 564.1    6. Advised about management of weight  Z78.9 V49.89    7. Hyperlipidemia, unspecified hyperlipidemia type  E78.5 272.4      Follow-up and Dispositions    · Return in about 3 months (around 10/14/2021), or if symptoms worsen or fail to improve, for cholesterol.        results and schedule of future studies reviewed with patient  reviewed diet, exercise and weight   cardiovascular risk and specific lipid/LDL goals reviewed  reviewed medications and side effects in detail    Reviewed weight loss and diet options  Encouraged Contrave component trial  Monitor nausea  Antihistamine - consider flonase  Continue other current medications       An electronic signature was used to authenticate this note.   -- Lorene Pedraza MD

## 2021-07-14 NOTE — PROGRESS NOTES
RM 14    Chief Complaint   Patient presents with    Follow-up     weight and mood        Visit Vitals  /80 (BP 1 Location: Left upper arm, BP Patient Position: Sitting, BP Cuff Size: Large adult)   Pulse 96   Temp 97.9 °F (36.6 °C) (Oral)   Resp 18   Ht 5' 2.5\" (1.588 m)   Wt 310 lb (140.6 kg)   SpO2 98%   BMI 55.80 kg/m²       3 most recent PHQ Screens 7/14/2021   PHQ Not Done -   Little interest or pleasure in doing things Not at all   Feeling down, depressed, irritable, or hopeless Several days   Total Score PHQ 2 1   Trouble falling or staying asleep, or sleeping too much Not at all   Feeling tired or having little energy Nearly every day   Poor appetite, weight loss, or overeating Nearly every day   Feeling bad about yourself - or that you are a failure or have let yourself or your family down Several days   Trouble concentrating on things such as school, work, reading, or watching TV Not at all   Moving or speaking so slowly that other people could have noticed; or the opposite being so fidgety that others notice Not at all   Thoughts of being better off dead, or hurting yourself in some way Not at all   PHQ 9 Score 8   How difficult have these problems made it for you to do your work, take care of your home and get along with others Not difficult at all         1. Have you been to the ER, urgent care clinic since your last visit? Hospitalized since your last visit? No    2. Have you seen or consulted any other health care providers outside of the [address] [place] [address] since your last visit? Include any pap smears or colon screening. Dermatologist     There are no preventive care reminders to display for this patient.     Learning Assessment 10/24/2017   PRIMARY LEARNER Patient   BARRIERS PRIMARY LEARNER NONE   PRIMARY LANGUAGE ENGLISH   LEARNER PREFERENCE PRIMARY DEMONSTRATION   ANSWERED BY patient   RELATIONSHIP SELF     AVS  education, follow up, and recommendations provided and addressed with patient.   services used to advise patient no

## 2021-07-23 DIAGNOSIS — R19.7 DIARRHEA, UNSPECIFIED TYPE: ICD-10-CM

## 2021-07-23 DIAGNOSIS — K59.00 CONSTIPATION, UNSPECIFIED CONSTIPATION TYPE: Primary | ICD-10-CM

## 2021-07-29 ENCOUNTER — HOSPITAL ENCOUNTER (OUTPATIENT)
Dept: GENERAL RADIOLOGY | Age: 36
Discharge: HOME | End: 2021-07-29
Attending: INTERNAL MEDICINE
Payer: COMMERCIAL

## 2021-07-29 DIAGNOSIS — R19.7 DIARRHEA, UNSPECIFIED TYPE: ICD-10-CM

## 2021-07-29 DIAGNOSIS — K59.00 CONSTIPATION, UNSPECIFIED CONSTIPATION TYPE: ICD-10-CM

## 2021-07-29 PROCEDURE — 74018 RADEX ABDOMEN 1 VIEW: CPT

## 2021-07-30 DIAGNOSIS — K59.00 CONSTIPATION: ICD-10-CM

## 2021-07-30 DIAGNOSIS — K59.00 CONSTIPATION, UNSPECIFIED CONSTIPATION TYPE: Primary | ICD-10-CM

## 2021-07-30 RX ORDER — POLYETHYLENE GLYCOL 3350 17 G/17G
17 POWDER, FOR SOLUTION ORAL DAILY
Qty: 527 G | Refills: 5 | Status: SHIPPED | OUTPATIENT
Start: 2021-07-30

## 2021-07-30 NOTE — PROGRESS NOTES
There is stool throughout the colon but moderate amount only. Not definitive for constipation, but in the setting of diarhea is suggestive of constipation and stooling around with loose stools. I think it is worthy of a miralax bowel \"cleanout\"  Miralax clean out - 7 capfuls of miralax in a 32 oz gatorade - drink over a few hours. May need to repeat x 2-3 days depending on results. But, in this case a single 32 oz dose should be adequate. Also, possible right kidney stone. Right sided colicky pain could be related to a kidney stone.

## 2021-10-06 NOTE — PROGRESS NOTES
Problem Visit    Chris Ivy is a 29 y.o. with pmh of PCOS (diagnosed clinically by anovulatory cycles and polycystic appearance to ovaries) presenting to discuss options for cycle control. Patient w/ multiple symptoms including lumbar and thoracic back pain, \"PMS symptoms\" including mood changes and tearfulness, body aches, extreme fatigue and nausea prior to menses. Unchanged since prior visit. Also with recent increase in migraine symptoms. Previously saw Shweta Vigil at [facility]. Pelvic US in 2019 showing normal pelvic structures. She was started on norethindrone >1 year ago, which initially helped with symptoms, but has not been helping for the past couple of months. Also frequently sees chiropractor for adjustments as she has had chronic low back pain for years, but wonders if it could be explained by endometriosis as it always seems worse prior to menses. Adjustments do help. She has also seen orthopedic spine doctors, had MRI showing some degenerative changes. She felt better for a period of time after PT and steroid injection of SI joint, reports this was \"the best she has felt\". Estrogen CI d/t multiple other comorbidities including HTN, migraines, etc. Pt is obese. She has been on metformin for management of insulin resistance and PCOS. She suffers from significant anxiety, depression and follows closely with psychiatry (who did not think trial of Brynda Naegeli was a good idea for her mental health). She has also been worked up by neurologist (had some \"abnormal brain lesion\"), but reports MS was ruled out. Never sexually active. Also with occasional GERD, and diarrhea assoc with metformin. TV ULTRASOUND 6/15/21  THE UTERUS IS ANTEVERTED, NORMAL IN SIZE AND ECHOGENICITY. THE ENDOMETRIUM MEASURES 9.0MM IN THICKNESS. NO MASSES OR ABNORMALITIES ARE SEEN. RIGHT OVARY APPEARS ENLARGED WITH MULTIPLE FOLLICLES SEEN ON THE PERIPHERY. LEFT OVARY APPEARS WNL. NO FREE FLUID IS SEEN IN THE CDS.     Ob/Gyn Hx: G0   LMP-9/9/21  Menarche- 13  Menses- regular (on mini-pill), have been irregular in the past  Contraception-POP  STI- denies  ? SA-never    Health maintenance:  Pap-within last 5 years, normal per patient  Gardasil-denies    Past Medical History:   Diagnosis Date    Anxiety     Depression     [place]   Gunnar Bennett Elevated blood pressure 5/31/2011    Hypertension     Irregular menses     Migraine 8/2/2011    Obesity     DENISE (obstructive sleep apnea)     cpap at 8    PCOS (polycystic ovarian syndrome)     endocrine Dr. Michelle Das       Past Surgical History:   Procedure Laterality Date    HX HEENT      dental       Family History   Problem Relation Age of Onset    Diabetes Mother     Other Mother         obesity    Arthritis-osteo Mother     Diabetes Father     Hypertension Father     Other Father         obesity    Prostate Cancer Father     Heart Disease Maternal Grandmother     Stroke Maternal Grandfather     Other Brother         obesity       Social History     Socioeconomic History    Marital status: SINGLE     Spouse name: Not on file    Number of children: Not on file    Years of education: Not on file    Highest education level: Not on file   Occupational History    Not on file   Tobacco Use    Smoking status: Never Smoker    Smokeless tobacco: Never Used   Substance and Sexual Activity    Alcohol use: Yes     Comment: rare    Drug use: No    Sexual activity: Never     Comment: Bith Conrol Pill used for acne tx   Other Topics Concern    Not on file   Social History Narrative    Not on file     Social Determinants of Health     Financial Resource Strain:     Difficulty of Paying Living Expenses:    Food Insecurity:     Worried About Running Out of Food in the Last Year:     [address] in the Last Year:    Transportation Needs:     Lack of Transportation (Medical):      Lack of Transportation (Non-Medical):    Physical Activity:     Days of Exercise per Week:     Minutes of Exercise per Session:    Stress:     Feeling of Stress :    Social Connections:     Frequency of Communication with Friends and Family:     Frequency of Social Gatherings with Friends and Family:     Attends Yazdanism Services:     Active Member of Clubs or Organizations:     Attends Club or Organization Meetings:     Marital Status:    Intimate Partner Violence:     Fear of Current or Ex-Partner:     Emotionally Abused:     Physically Abused:     Sexually Abused:        Current Outpatient Medications   Medication Sig Dispense Refill    polyethylene glycol (MIRALAX) 17 gram/dose powder Take 17 g by mouth daily. clean out - 7 capfuls of miralax in a 32 oz gatorade - drink over a few hours. May need to repeat x 2-3 days depending on results. 527 g 5    hydrocortisone (HYTONE) 2.5 % topical cream APPLY AS NEEDED FOR FLARES ON CHEST      metroNIDAZOLE (METROCREAM) 0.75 % topical cream       triamcinolone acetonide (KENALOG) 0.1 % topical cream Apply  to affected area two (2) times a day. use thin layer x 3-5 days (Patient not taking: Reported on 7/14/2021) 30 g 1    Omeprazole delayed release (PRILOSEC D/R) 20 mg tablet Take 1 Tab by mouth daily. (Patient not taking: Reported on 7/14/2021) 30 Tab 5    norethindrone (MICRONOR) 0.35 mg tab TAKE ONE TABLET BY MOUTH ONE TIME DAILY START ON SUNDAY OF NEXT MENSES  3    ketoconazole (NIZORAL) 2 % topical cream Apply  to affected area two (2) times a day. 30 g 1    LORazepam (ATIVAN) 0.5 mg tablet Take 0.5 mg by mouth. Take one half      metFORMIN ER (GLUCOPHAGE XR) 500 mg tablet TAKE 4 TABLETS DAILY WITH DINNER 120 tablet 11    sertraline (ZOLOFT) 100 mg tablet Take 1 Tab by mouth daily.  90 Tab 1       Allergies   Allergen Reactions    Topamax [Topiramate] Other (comments)     Suicidal thoughts       Review of Systems - History obtained from the patient  Constitutional: negative for weight loss, fever, night sweats, +fatigue  HEENT: negative for hearing loss, earache, congestion, snoring, sorethroat  CV: negative for chest pain, palpitations, edema  Resp: negative for cough, shortness of breath, wheezing  GI: negative for change in bowel habits, abdominal pain, black or bloody stools, +nausea  : negative for frequency, dysuria, hematuria, vaginal discharge, +pelvic/back pain  MSK: negative for back pain, joint pain, muscle pain  Breast: negative for breast lumps, nipple discharge, galactorrhea, +left breast tenderness  Skin :negative for itching, rash, hives  Neuro: negative for dizziness, headache, confusion, weakness  Psych: negative for anxiety, depression, change in mood  Heme/lymph: negative for bleeding, bruising, pallor    Physical Exam  Visit Vitals  /86   Ht 5' 2.5\" (1.588 m)   Wt 311 lb (141.1 kg)   LMP 09/09/2021   BMI 55.98 kg/m²       PHYSICAL EXAMINATION    Constitutional  · Appearance: well-nourished, well developed, alert, in no acute distress, +morbidly obese    HENT  · Head and Face: appears normal    Neck  · Inspection/Palpation: normal appearance, no masses or tenderness  · Lymph Nodes: no lymphadenopathy present  · Thyroid: gland size normal, nontender, no nodules or masses present on palpation    Chest  · Respiratory Effort: breathing non-labored  · Auscultation: normal breath sounds    Cardiovascular  · Heart:  · Auscultation: regular rate and rhythm without murmur    Breasts  · Inspection of Breasts: breasts symmetrical, no skin changes, no discharge present, nipple appearance normal, no skin retraction present  · Palpation of Breasts and Axillae: no masses present on palpation, no breast tenderness  · Axillary Lymph Nodes: no lymphadenopathy present    Gastrointestinal  · Abdominal Examination: abdomen non-tender to palpation, normal bowel sounds, no masses present, +obesity limiting assessment  · Liver and spleen: no hepatomegaly present, spleen not palpable  · Hernias: no hernias identified    Genitourinary *use long Billy speculum for exam* --> EXAM deferred/declined today    Skin  · General Inspection: no rash, no lesions identified    Neurologic/Psychiatric  · Mental Status:  · Orientation: grossly oriented to person, place and time  · Mood and Affect: mood normal, affect appropriate    Assessment/Plan:  29 y.o. G0 presenting for AE and for follow up of multiple symptoms including lumbar and thoracic back pain, body aches, \"PMS symptoms\", anxiety/depression, extreme fatigue, nausea and breast pain prior to menses. Currently with irregular bleeding episodes on norethindrone. Discussed favor MSK cause of LBP given history and context, but cannot exclude endometriosis or hormonally mediated symptoms. Also cannot exclude a more systemic cause like fibromyalgia, etc.     -continue norethindrone for now, also discussed other progestin only options, pt considering Slynd vs. IUD, pt declines Depo/Nexplanon  -pt spoke with psychiatrist who does NOT recommend Chirinos Carbo due to concerns about exacerbating mood symptoms  -advised pt see internist or rheumatologist to discuss systemic causes of symptoms  -referral to spine/pain specialist today   -consider diagnostic laparoscopy in future to evaluate for endometriosis if pelvic symptoms worsen, but at this time, favor MSK etiology and do not believe this would be helped by surgical intervention  -encourage ongoing management with orthopedics and chiropractor  -encourage healthy diet, exercise, weight loss, etc    RTC 1 year for AE or sooner jose c Betancur MD  10/7/2021  12:20 PM           Billing: Total time of visit = 35 min  >50% of time was spent in direct face-to-face counseling with patient.

## 2021-10-07 ENCOUNTER — OFFICE VISIT (OUTPATIENT)
Dept: OBGYN CLINIC | Age: 36
End: 2021-10-07
Payer: COMMERCIAL

## 2021-10-07 VITALS
DIASTOLIC BLOOD PRESSURE: 86 MMHG | HEIGHT: 63 IN | SYSTOLIC BLOOD PRESSURE: 124 MMHG | BODY MASS INDEX: 51.91 KG/M2 | WEIGHT: 293 LBS

## 2021-10-07 DIAGNOSIS — E28.2 PCOS (POLYCYSTIC OVARIAN SYNDROME): ICD-10-CM

## 2021-10-07 DIAGNOSIS — E66.01 MORBID OBESITY (HCC): ICD-10-CM

## 2021-10-07 DIAGNOSIS — F32.A ANXIETY AND DEPRESSION: ICD-10-CM

## 2021-10-07 DIAGNOSIS — M54.9 BACK PAIN, UNSPECIFIED BACK LOCATION, UNSPECIFIED BACK PAIN LATERALITY, UNSPECIFIED CHRONICITY: Primary | ICD-10-CM

## 2021-10-07 DIAGNOSIS — N94.6 DYSMENORRHEA: ICD-10-CM

## 2021-10-07 DIAGNOSIS — G43.809 OTHER MIGRAINE WITHOUT STATUS MIGRAINOSUS, NOT INTRACTABLE: ICD-10-CM

## 2021-10-07 DIAGNOSIS — F41.9 ANXIETY AND DEPRESSION: ICD-10-CM

## 2021-10-07 PROCEDURE — 99214 OFFICE O/P EST MOD 30 MIN: CPT | Performed by: OBSTETRICS & GYNECOLOGY

## 2021-10-07 RX ORDER — LITHIUM CARBONATE 300 MG
150 TABLET ORAL DAILY
COMMUNITY
Start: 2021-09-16

## 2021-10-07 NOTE — PATIENT INSTRUCTIONS
Learning About Birth Control  What is birth control? Birth control is any method used to prevent pregnancy. Another word for birth control is contraception. If you have sex without birth control, there is a chance that you could get pregnant. This is true even if you have not started having periods yet or you are getting close to menopause. The only sure way to prevent pregnancy is to not have sex. But finding a good method of birth control that you are comfortable with can help you avoid an unplanned pregnancy. Be sure to tell your doctor about any health problems you have or medicines you take. He or she can help you choose the birth control method that is right for you. What are the types of birth control? There are many different kinds of birth control. Each has pros and cons. Learning about all the methods will help you find one that is right for you. · Long-acting reversible contraception (LARC) is the most effective reversible method you can use to prevent pregnancy. If you decide you want to get pregnant, you can have them removed. LARCs are implants and intrauterine devices (IUDs). While they are being used, they usually prevent pregnancy for years. ? Implants are placed under the skin of the arm. They release the hormone progestin and prevent pregnancy for about 3 years. ? IUDs are placed in the uterus by a doctor. There are two main types of IUDs--the copper IUD and the hormonal IUD. The hormonal IUD releases progestin. IUDs prevent pregnancy for 3 to 10 years, depending on the type. · Hormonal methods are very good at preventing pregnancy. Combination birth control pills (\"the pill\"), skin patches, and vaginal rings release the hormones estrogen and progestin. Shots, mini-pills, hormonal IUDs, and implants release progestin only. · Barrier methods generally do not prevent pregnancy as well as IUDs or hormonal methods do.  Barrier methods include condoms, diaphragms, cervical caps, and sponges. You must use barrier methods every time you have sex. · Natural family planning can work if you and your partner are very careful and you have a regular ovulation cycle. You will need to keep good records so you know when you are most likely to become pregnant (you are fertile). And during times you are fertile, you will need to not have sex or to use a barrier method. Natural family planning is also known as fertility awareness and the rhythm method. · Permanent birth control (sterilization) gives you lasting protection against pregnancy. A man can have a vasectomy, or a woman can have her tubes tied (tubal ligation). But this is only a good choice if you are sure that you don't want any (or any more) children. · Emergency contraception is a backup method to prevent pregnancy if you didn't use birth control or a condom breaks. The most effective emergency contraception is prescribed by a doctor. This includes the copper IUD (inserted by a doctor) or a prescription pill. You can also get emergency contraceptive pills without a prescription at most drugstores. How do you choose the best method? The best method of birth control is one that protects you every time you have sex. This usually depends on how well you use it. To find a method that will work best for you, think about:  · How well it works. Think about how important it is to you to avoid pregnancy. Then look at how well each method works. For example, if you plan to have a child soon anyway, you may not need a very reliable method. If you don't want children but feel it is wrong to end a pregnancy, choose a type of birth control that works very well. · How much effort it takes. For example, birth control pills may not be a good choice if you often forget to take medicine. Or, if you are not sure you will stop and use a barrier method each time you have sex, pick another method. · How much the method costs.  For example, condoms are cheap or free in some clinics. Some insurance companies cover the cost of prescription birth control. But cost can sometimes be misleading. An IUD costs a lot up front. But it works for years, making it low-cost over time. · Whether it protects you from infection. Latex condoms can help protect you from sexually transmitted infections (STIs), such as herpes or HIV/AIDS. But they are not the best way to prevent pregnancy. To avoid both STIs and pregnancy, use condoms along with another type of birth control. · Whether you've had a problem with one kind of birth control. Finding the best method of birth control may involve trying something different. Also, you may need to change a method that once worked well for you. · Whether you want children. If you are positive you don't want children, a lasting method of birth control might be best.  · Your health issues. Some birth control methods may not be safe for you, depending on your health issues. For example, women who smoke, are breastfeeding, or have had breast cancer may not be able to use certain methods. How can you get birth control? · You can buy:  ? Condoms, sponges, and spermicides without a prescription in drugstores, online, and in many grocery stores. ? Some forms of emergency contraception without a prescription at most drugstores. · You need to see a doctor or visit a family planning clinic to:  ? Get a prescription for birth control pills and other methods that use hormones. ? Have an implant or IUD inserted, including the type of IUD used for emergency contraception. ? Get a hormone shot. ? Get a prescription for a diaphragm or cervical cap. ? Get a prescription for certain kinds of emergency contraception. Where can you learn more? Go to http://www.gray.com/  Enter P481 in the search box to learn more about \"Learning About Birth Control. \"  Current as of: June 16, 2021               Content Version: 13.0  © 2850-7103 [employer]. Care instructions adapted under license by [employer] (which disclaims liability or warranty for this information). If you have questions about a medical condition or this instruction, always ask your healthcare professional. [address] any warranty or liability for your use of this information.

## 2021-10-15 ENCOUNTER — OFFICE VISIT (OUTPATIENT)
Dept: INTERNAL MEDICINE CLINIC | Age: 36
End: 2021-10-15
Payer: COMMERCIAL

## 2021-10-15 VITALS
SYSTOLIC BLOOD PRESSURE: 126 MMHG | WEIGHT: 293 LBS | TEMPERATURE: 98.9 F | HEIGHT: 64 IN | BODY MASS INDEX: 50.02 KG/M2 | DIASTOLIC BLOOD PRESSURE: 82 MMHG | HEART RATE: 81 BPM | OXYGEN SATURATION: 99 %

## 2021-10-15 DIAGNOSIS — N94.6 DYSMENORRHEA: ICD-10-CM

## 2021-10-15 DIAGNOSIS — K58.9 IRRITABLE BOWEL SYNDROME, UNSPECIFIED TYPE: ICD-10-CM

## 2021-10-15 DIAGNOSIS — E28.2 PCOS (POLYCYSTIC OVARIAN SYNDROME): ICD-10-CM

## 2021-10-15 DIAGNOSIS — G43.909 MIGRAINE WITHOUT STATUS MIGRAINOSUS, NOT INTRACTABLE, UNSPECIFIED MIGRAINE TYPE: Primary | ICD-10-CM

## 2021-10-15 DIAGNOSIS — J30.2 SEASONAL ALLERGIC RHINITIS, UNSPECIFIED TRIGGER: ICD-10-CM

## 2021-10-15 DIAGNOSIS — E78.5 HYPERLIPIDEMIA, UNSPECIFIED HYPERLIPIDEMIA TYPE: ICD-10-CM

## 2021-10-15 DIAGNOSIS — R51.9 SINUS HEADACHE: ICD-10-CM

## 2021-10-15 DIAGNOSIS — E66.01 OBESITY, MORBID (HCC): ICD-10-CM

## 2021-10-15 DIAGNOSIS — G47.33 OSA (OBSTRUCTIVE SLEEP APNEA): ICD-10-CM

## 2021-10-15 DIAGNOSIS — E55.9 VITAMIN D DEFICIENCY: ICD-10-CM

## 2021-10-15 DIAGNOSIS — F41.9 ANXIETY: ICD-10-CM

## 2021-10-15 PROCEDURE — 99215 OFFICE O/P EST HI 40 MIN: CPT | Performed by: INTERNAL MEDICINE

## 2021-10-15 NOTE — PROGRESS NOTES
RM 16    Chief Complaint   Patient presents with    Routine     3 mo cholesterol follow-up     1. Have you been to the ER, urgent care clinic since your last visit? Hospitalized since your last visit? Urgent care last week of Sept. 2021 - Sinus infections    2. Have you seen or consulted any other health care providers outside of the [address] [place] [address] since your last visit? Include any pap smears or colon screening. No    Health Maintenance Due   Topic Date Due    Flu Vaccine (1) 09/01/2021     Visit Vitals  /82 (BP 1 Location: Left upper arm, BP Patient Position: Sitting)   Pulse 81   Temp 98.9 °F (37.2 °C) (Oral)   Ht 5' 4\" (1.626 m)   Wt 311 lb 6 oz (141.2 kg)   SpO2 99%   BMI 53.45 kg/m²         Learning Assessment 10/24/2017   PRIMARY LEARNER Patient   BARRIERS PRIMARY LEARNER NONE   PRIMARY LANGUAGE ENGLISH   LEARNER PREFERENCE PRIMARY DEMONSTRATION   ANSWERED BY patient   RELATIONSHIP SELF         AVS  education, follow up, and recommendations provided and addressed with patient.   services used to advise patient No.

## 2021-10-15 NOTE — PROGRESS NOTES
HPI:  established patient  Presents for f/u several issues    Sinus pressure s/p sinus infection tx'd per patient first    Use of dorothea pot helps some. HAs - +stress related. excedrin migraine rec'd by neuro  More recently increased frequency of migraine HA  Some with aura, other time not. Seeing GYN  Doing progestin only hormones  Considering IUD    Monthly cyclical malaise     Seeing psych  Ups and downs  +anxiety  Resumed lithium    Stress at work - transition to [address] [place] [address] Rd at [facility] on nutrition. Past medical, Social, and Family history reviewed    Prior to Admission medications    Medication Sig Start Date End Date Taking? Authorizing Provider   lithium carbonate 300 mg tablet  9/16/21  Yes Provider, Historical   metroNIDAZOLE (METROCREAM) 0.75 % topical cream  5/24/21  Yes Provider, Historical   norethindrone (MICRONOR) 0.35 mg tab TAKE ONE TABLET BY MOUTH ONE TIME DAILY START ON SUNDAY OF NEXT MENSES 10/13/19  Yes Provider, Historical   metFORMIN ER (GLUCOPHAGE XR) 500 mg tablet TAKE 4 TABLETS DAILY WITH DINNER 11/20/14  Yes Concepcion Landers MD   sertraline (ZOLOFT) 100 mg tablet Take 1 Tab by mouth daily. 7/16/14  Yes Concepcion Landers MD   polyethylene glycol [facility]) 17 gram/dose powder Take 17 g by mouth daily. clean out - 7 capfuls of miralax in a 32 oz gatorade - drink over a few hours. May need to repeat x 2-3 days depending on results. Patient not taking: Reported on 10/7/2021 7/30/21   Concepcion Landers MD   hydrocortisone (HYTONE) 2.5 % topical cream APPLY AS NEEDED FOR FLARES ON CHEST  Patient not taking: Reported on 10/7/2021 5/24/21   Provider, Historical   triamcinolone acetonide (KENALOG) 0.1 % topical cream Apply  to affected area two (2) times a day. use thin layer x 3-5 days  Patient not taking: Reported on 7/14/2021 6/1/21   Concepcion Landers MD   Omeprazole delayed release (PRILOSEC D/R) 20 mg tablet Take 1 Tab by mouth daily.   Patient not taking: Reported on 7/14/2021 2/2/21   Lissette Faye MD   ketoconazole (NIZORAL) 2 % topical cream Apply  to affected area two (2) times a day. Patient not taking: Reported on 10/7/2021 5/20/19   Lissette Faye MD   LORazepam (ATIVAN) 0.5 mg tablet Take 0.5 mg by mouth. Take one half    Provider, Historical          ROS  Complete ROS reviewed and negative or stable except as noted in HPI. Physical Exam  Vitals and nursing note reviewed. Constitutional:       General: She is not in acute distress. Comments: Obese    HENT:      Head: Normocephalic and atraumatic. Eyes:      General: No scleral icterus. Pupils: Pupils are equal, round, and reactive to light. Cardiovascular:      Rate and Rhythm: Normal rate and regular rhythm. Heart sounds: Normal heart sounds. No murmur heard. No friction rub. No gallop. Pulmonary:      Effort: Pulmonary effort is normal. No respiratory distress. Breath sounds: Normal breath sounds. No wheezing or rales. Abdominal:      General: Bowel sounds are normal. There is no distension. Palpations: Abdomen is soft. Tenderness: There is no abdominal tenderness. Comments: Obese    Musculoskeletal:         General: Normal range of motion. Cervical back: Normal range of motion and neck supple. Skin:     General: Skin is warm. Findings: No rash. Neurological:      Mental Status: She is alert and oriented to person, place, and time. Motor: No abnormal muscle tone. Prior labs reviewed. Reviewed prior imaging report      Assessment/Plan:    ICD-10-CM ICD-9-CM    1. Migraine without status migrainosus, not intractable, unspecified migraine type  G43.909 346.90    2. PCOS (polycystic ovarian syndrome)  E28.2 256.4    3. Obesity, morbid ([facility]  E66.01 278.01    4. Anxiety  F41.9 300.00    5. Irritable bowel syndrome, unspecified type  K58.9 564.1    6.  Hyperlipidemia, unspecified hyperlipidemia type  E78.5 272.4 LIPID PANEL      METABOLIC PANEL, COMPREHENSIVE   7. Vitamin D deficiency  E55.9 268.9 VITAMIN D, 25 HYDROXY   8. Dysmenorrhea  N94.6 625.3    9. DENISE (obstructive sleep apnea)  G47.33 327.23 CBC WITH AUTOMATED DIFF   10. Seasonal allergic rhinitis, unspecified trigger  J30.2 477.9    11. Sinus headache  R51.9 784.0      Follow-up and Dispositions    · Return in about 3 months (around 1/15/2022), or if symptoms worsen or fail to improve, for follow up . results and schedule of future studies reviewed with patient  reviewed diet, exercise and weight   cardiovascular risk and specific lipid/LDL goals reviewed  reviewed medications and side effects in detail    Consider Jane Ortiz - pt to discuss with neuro  Flonase, claritin  Pt to see new GYN via virtual visit soon. Continue other current medications  Labs - to return      On this date 10/15/2021 I have spent 45 minutes reviewing previous notes, test results and face to face with the patient discussing the diagnosis and importance of compliance with the treatment plan as well as documenting on the day of the visit. An electronic signature was used to authenticate this note.   -- Sarah Still MD

## 2021-10-26 DIAGNOSIS — R00.2 PALPITATIONS: Primary | ICD-10-CM

## 2021-11-02 ENCOUNTER — HOSPITAL ENCOUNTER (OUTPATIENT)
Dept: NON INVASIVE DIAGNOSTICS | Age: 36
Discharge: HOME | End: 2021-11-02
Attending: INTERNAL MEDICINE
Payer: COMMERCIAL

## 2021-11-02 DIAGNOSIS — R00.2 PALPITATIONS: ICD-10-CM

## 2021-11-02 PROCEDURE — 93225 XTRNL ECG REC<48 HRS REC: CPT

## 2021-11-09 PROCEDURE — 93227 XTRNL ECG REC<48 HR R&I: CPT | Performed by: INTERNAL MEDICINE

## 2021-11-09 NOTE — PROGRESS NOTES
Some rare extra beats = PVCs  These are benign and won't result in any adverse effects. But, when they occur it can feel like a skipped beat. No other fast rate or irregular rhythm on the monitoring.

## 2021-11-24 DIAGNOSIS — K21.9 GASTROESOPHAGEAL REFLUX DISEASE, UNSPECIFIED WHETHER ESOPHAGITIS PRESENT: ICD-10-CM

## 2021-11-24 RX ORDER — PHENOL/SODIUM PHENOLATE
20 AEROSOL, SPRAY (ML) MUCOUS MEMBRANE DAILY
Qty: 90 TABLET | Refills: 1 | Status: SHIPPED | OUTPATIENT
Start: 2021-11-24 | End: 2021-11-29 | Stop reason: CLARIF

## 2021-11-26 ENCOUNTER — OFFICE VISIT (OUTPATIENT)
Dept: OBGYN CLINIC | Age: 36
End: 2021-11-26
Payer: COMMERCIAL

## 2021-11-26 VITALS
WEIGHT: 293 LBS | BODY MASS INDEX: 50.02 KG/M2 | HEIGHT: 64 IN | DIASTOLIC BLOOD PRESSURE: 82 MMHG | SYSTOLIC BLOOD PRESSURE: 124 MMHG

## 2021-11-26 DIAGNOSIS — Z30.017 NEXPLANON INSERTION: Primary | ICD-10-CM

## 2021-11-26 PROCEDURE — 11981 INSERTION DRUG DLVR IMPLANT: CPT | Performed by: OBSTETRICS & GYNECOLOGY

## 2021-11-26 NOTE — PATIENT INSTRUCTIONS
Implant for Birth Control: Care Instructions  Your Care Instructions     The implant is used to prevent pregnancy. It's a thin jb about the size of a matchstick that is inserted under the skin (subdermal) on the inside of your arm. The implant prevents pregnancy for 3 years. After it is put in, you don't have to do anything else to prevent pregnancy. Follow-up care is a key part of your treatment and safety. Be sure to make and go to all appointments, and call your doctor if you are having problems. It's also a good idea to know your test results and keep a list of the medicines you take. How can you care for yourself at home? How do you use the subdermal implant? · The implant is put in by your doctor or another trained health professional. It only takes a few minutes. This can also be done right after you give birth. Your doctor will remove the implant when it needs to be taken out. · An adhesive bandage and a tight (pressure) bandage will be placed over the site. This helps reduce swelling and bruising. · Ask your doctor if you need to use backup birth control, such as a condom, for a week after insertion. Whether you need to do this depends on where you are in your cycle. How can you care for the insertion site? · Remove the pressure bandage after 24 hours. Keep the area dry. · Keep an adhesive bandage on the site for 3 to 5 days after the procedure. · If you have pain, use an ice pack or take an over-the-counter pain medicine. Some soreness or bruising is normal.  What else do you need to know? · It's safe to use while breastfeeding. · The implant has side effects. ? You may have changes in your period. Your period may stop. You may also have spotting or bleeding between periods. ? You may have mood changes, less interest in sex, or weight gain. · The implant can stay in place for up to 3 years to prevent pregnancy. But your doctor may talk to you about leaving it in for longer.   ? If you don't replace the implant and don't use another form of birth control, you could get pregnant. ? If you have the implant removed, you'll have to find another method of birth control. If you don't, you may get pregnant. ? Even if you are planning to get pregnant, you have to have the implant removed. · Check with your doctor before you use any other medicines. This includes over-the-counter medicines, vitamins, herbal products, and supplements. Birth control hormones may not work as well to prevent pregnancy when combined with other medicines. · The implant doesn't protect against sexually transmitted infections (STIs), such as herpes or HIV/AIDS. If you're not sure if your sex partner might have an STI, use a condom to protect against infection. When should you call for help? Call 911  anytime you think you may need emergency care. For example, call if:    · You have shortness of breath.     · You have chest pain.     · You passed out (lost consciousness).     · You cough up blood. Call your doctor now or seek immediate medical care if:    · You have severe pain or numbness and tingling in the arm where the implant was inserted.     · You have increased pain, swelling, warmth, or redness at the insertion site.     · You have signs of a blood clot in your leg (called a deep vein thrombosis), such as:  ? Pain in your calf, back of the knee, thigh, or groin. ? Redness and swelling in your leg. Watch closely for changes in your health, and be sure to contact your doctor if:    · You can't feel your implant.     · You think your implant might be bent or broken in your arm.     · You think you might be pregnant.     · You have any problems with your birth control method. Where can you learn more? Go to http://www.gray.com/  Enter V768 in the search box to learn more about \"Implant for Birth Control: Care Instructions. \"  Current as of: June 16, 2021               Content Version: 13.0  © 4042-3863 [employer]. Care instructions adapted under license by [employer] (which disclaims liability or warranty for this information). If you have questions about a medical condition or this instruction, always ask your healthcare professional. [address] any warranty or liability for your use of this information.

## 2021-11-26 NOTE — PROGRESS NOTES
Nexplanon insertion    She was examined in a sitting position with her left arm flexed. The groove between the bicep and tricep muscle was identified and care taken to avoid this groove. The insertion site was identified and marked below this, over the tricep muscle approximately 8 and 10 cm proximal to the medial epicondyle of the upper arm. A second kamari was placed 4 cm above the last kamari. She reclined on the examination table in the supine position with her left arm flexed at the elbow and externally rotated. The insertion site was again identified and marked approximately 8 and 10 cm proximal to the medial epicondyle of the upper arm over the tricep muscle. A second kamari was placed 4 cm above the last kamari. The insertion site was cleansed with an alcohol wipe. Approximately 3 ml of 1% xylocaine were injected just under the skin along the planned insertion canal. Adequate time was given to allow the lidocaine to take effect. The insertion site was then prepped with Betadine and draped in a sterile fashion. The NEXPLANON sterile applicator was carefully removed from its blister pack and kept sterile. I removed the needle cap. I visually verified the presence of NEXPLANON inside the needle tip. The skin at the insertion site was then stretched by my thumb and index finger. I then inserted the needle tip through the skin at the appropriate angle to the skin surface, just until the skin has been penetrated. The needle was gently inserted to its full length. The slider was then pushed down and then moved back until it stopped. I then removed the needle and palpated the implant in the appropriate location. The patient also palpated the implant in place. Both Mimi and BABS were able to confirm the presence of the Merle Saliva in its subdermal location by palpation. I placed steristrips,and a small adhesive bandage over the insertion site. Her arm was then wrapped a pressure bandage with sterile gauze.      The patient User Card and Patient Chart Label were filled in. She was given the User Card for her records after explaining it to her in detail. I stressed to her that she must have the Shefali Mohamud removed before three years from today's date    The patient received Nexplanon lot number [id number].

## 2021-11-29 RX ORDER — OMEPRAZOLE 20 MG/1
20 CAPSULE, DELAYED RELEASE ORAL DAILY
Qty: 90 CAPSULE | Refills: 1 | Status: SHIPPED | OUTPATIENT
Start: 2021-11-29 | End: 2022-08-10 | Stop reason: SDUPTHER

## 2021-11-29 NOTE — TELEPHONE ENCOUNTER
Dr. Stephan Tapia,          The Prilosec 20 tab is not covered by pt's current insurance plan. A suggested covered alternative is the Prilosec 20 mg caps. Please review and prescribe if appropriate. Thank you. Last visit 10/15/2021 MD Stephan Tapia   Next appointment 01/17/2022 MD Hummel       Requested Prescriptions     Pending Prescriptions Disp Refills    omeprazole (PRILOSEC) 20 mg capsule 90 Capsule 1     Sig: Take 1 Capsule by mouth daily.

## 2021-12-21 ENCOUNTER — PATIENT MESSAGE (OUTPATIENT)
Dept: INTERNAL MEDICINE CLINIC | Age: 36
End: 2021-12-21

## 2021-12-22 NOTE — TELEPHONE ENCOUNTER
Called pt to discuss and add on for a visit. States she has been having low BG, last night was 109 and this morning 77. Pt was instructed to keep a log of BG and added to schedule.    Future Appointments   Date Time Provider Quentin Thomas   1/5/2022  3:00 PM Fabiola Tadeo MD [facility] AM[facility]   1/17/2022  3:30 PM Fabiola Tadeo MD [facility] AMB

## 2022-01-05 ENCOUNTER — OFFICE VISIT (OUTPATIENT)
Dept: INTERNAL MEDICINE CLINIC | Age: 37
End: 2022-01-05
Payer: COMMERCIAL

## 2022-01-05 VITALS
RESPIRATION RATE: 17 BRPM | HEART RATE: 95 BPM | BODY MASS INDEX: 50.02 KG/M2 | HEIGHT: 64 IN | OXYGEN SATURATION: 97 % | DIASTOLIC BLOOD PRESSURE: 79 MMHG | TEMPERATURE: 97.7 F | WEIGHT: 293 LBS | SYSTOLIC BLOOD PRESSURE: 114 MMHG

## 2022-01-05 DIAGNOSIS — R73.09 ABNORMAL BLOOD SUGAR: ICD-10-CM

## 2022-01-05 DIAGNOSIS — G47.33 OSA (OBSTRUCTIVE SLEEP APNEA): ICD-10-CM

## 2022-01-05 DIAGNOSIS — G47.9 SLEEP DISTURBANCE: Primary | ICD-10-CM

## 2022-01-05 DIAGNOSIS — R35.0 URINARY FREQUENCY: ICD-10-CM

## 2022-01-05 DIAGNOSIS — F41.9 ANXIETY: ICD-10-CM

## 2022-01-05 DIAGNOSIS — E16.2 LOW BLOOD SUGAR: ICD-10-CM

## 2022-01-05 DIAGNOSIS — R35.1 NOCTURIA: ICD-10-CM

## 2022-01-05 LAB
BILIRUB UR QL STRIP: NEGATIVE
GLUCOSE UR-MCNC: NEGATIVE MG/DL
HBA1C MFR BLD HPLC: 5.3 %
KETONES P FAST UR STRIP-MCNC: ABNORMAL MG/DL
PH UR STRIP: 5.5 [PH] (ref 4.6–8)
PROT UR QL STRIP: NEGATIVE
SP GR UR STRIP: 1.02 (ref 1–1.03)
UA UROBILINOGEN AMB POC: ABNORMAL (ref 0.2–1)
URINALYSIS CLARITY POC: ABNORMAL
URINALYSIS COLOR POC: ABNORMAL
URINE BLOOD POC: ABNORMAL
URINE LEUKOCYTES POC: ABNORMAL
URINE NITRITES POC: NEGATIVE

## 2022-01-05 PROCEDURE — 83036 HEMOGLOBIN GLYCOSYLATED A1C: CPT | Performed by: INTERNAL MEDICINE

## 2022-01-05 PROCEDURE — 81003 URINALYSIS AUTO W/O SCOPE: CPT | Performed by: INTERNAL MEDICINE

## 2022-01-05 PROCEDURE — 99214 OFFICE O/P EST MOD 30 MIN: CPT | Performed by: INTERNAL MEDICINE

## 2022-01-05 RX ORDER — SERTRALINE HYDROCHLORIDE 100 MG/1
250 TABLET, FILM COATED ORAL
COMMUNITY

## 2022-01-05 RX ORDER — SERTRALINE HYDROCHLORIDE 50 MG/1
TABLET, FILM COATED ORAL
COMMUNITY
End: 2022-04-01

## 2022-01-05 NOTE — PROGRESS NOTES
Small leukocyte esterase (sign of white blood cells)  Let's see what the lab micro and/or culture show before taking an antibiotic.

## 2022-01-05 NOTE — PROGRESS NOTES
HPI:  established patient  Presents for f/u sleep, urinary sx and blood sugars    Urination at night. No dysuria  Better with fluid adjustments  Chronic hx urgency    Sleep trouble - trouble staying asleep  Related to stressors per pt report  Using CPAP most every night  Awakens at 4 am and cannot get back to sleep    On Implanon, placed at the end of November. Single, light menstrual cycle. O/w tolerating OK    Past medical, Social, and Family history reviewed    Prior to Admission medications    Medication Sig Start Date End Date Taking? Authorizing Provider   sertraline (ZOLOFT) 100 mg tablet sertraline 100 mg tablet   Take 2 tablets every day by oral route. Yes Provider, Historical   sertraline (ZOLOFT) 50 mg tablet sertraline 50 mg tablet   Take 1 tablet every day by oral route. Yes Provider, Historical   omeprazole (PRILOSEC) 20 mg capsule Take 1 Capsule by mouth daily. 11/29/21  Yes Anahi Navarro MD   lithium carbonate 300 mg tablet Take 150 mg by mouth daily. 9/16/21  Yes Provider, Historical   metroNIDAZOLE (METROCREAM) 0.75 % topical cream  5/24/21  Yes Provider, Historical   ketoconazole (NIZORAL) 2 % topical cream Apply  to affected area two (2) times a day. 5/20/19  Yes Anahi Navarro MD   LORazepam (ATIVAN) 0.5 mg tablet Take 0.5 mg by mouth. Take one half   Indications: patient reports takes 1-2 tablets up to 3 times daily as needed   Yes Provider, Historical   metFORMIN ER (GLUCOPHAGE XR) 500 mg tablet TAKE 4 TABLETS DAILY WITH DINNER 11/20/14  Yes Anahi Navarro MD   etonogestreL 68 mg impl 1 Each once. Provider, Historical   polyethylene glycol (MIRALAX) 17 gram/dose powder Take 17 g by mouth daily. clean out - 7 capfuls of miralax in a 32 oz gatorade - drink over a few hours. May need to repeat x 2-3 days depending on results.   Patient not taking: Reported on 10/7/2021 7/30/21   Anahi Navarro MD   hydrocortisone (HYTONE) 2.5 % topical cream APPLY AS NEEDED FOR FLARES ON CHEST  Patient not taking: Reported on 10/7/2021 5/24/21   Provider, Historical   triamcinolone acetonide (KENALOG) 0.1 % topical cream Apply  to affected area two (2) times a day. use thin layer x 3-5 days  Patient not taking: Reported on 7/14/2021 6/1/21   Latha Moreno MD   sertraline (ZOLOFT) 100 mg tablet Take 1 Tab by mouth daily. Patient not taking: Reported on 1/5/2022 7/16/14   Latha Moreno MD          ROS  Complete ROS reviewed and negative or stable except as noted in HPI. Physical Exam  Vitals and nursing note reviewed. Constitutional:       General: She is not in acute distress. Comments: Obese    HENT:      Head: Normocephalic and atraumatic. Eyes:      General: No scleral icterus. Pupils: Pupils are equal, round, and reactive to light. Cardiovascular:      Rate and Rhythm: Normal rate and regular rhythm. Heart sounds: Normal heart sounds. No murmur heard. No friction rub. No gallop. Pulmonary:      Effort: Pulmonary effort is normal. No respiratory distress. Breath sounds: Normal breath sounds. No wheezing or rales. Abdominal:      General: Bowel sounds are normal. There is no distension. Palpations: Abdomen is soft. Tenderness: There is no abdominal tenderness. Comments: Obese    Musculoskeletal:         General: Normal range of motion. Cervical back: Normal range of motion and neck supple. Skin:     General: Skin is warm. Findings: No rash. Neurological:      Mental Status: She is alert and oriented to person, place, and time. Motor: No abnormal muscle tone. Prior labs reviewed. Assessment/Plan:    ICD-10-CM ICD-9-CM    1. Sleep disturbance  G47.9 780.50    2. Urinary frequency  R35.0 788.41 AMB POC URINALYSIS DIP STICK MANUAL W/O MICRO      CULTURE, URINE      URINALYSIS W/ RFLX MICROSCOPIC      CANCELED: AMB POC URINALYSIS DIP STICK AUTO W/ MICRO   3.  Abnormal blood sugar  R73.09 790.29 AMB POC HEMOGLOBIN A1C   4. Nocturia  R35.1 788.43 AMB POC URINALYSIS DIP STICK MANUAL W/O MICRO      CULTURE, URINE      URINALYSIS W/ RFLX MICROSCOPIC   5. DENISE (obstructive sleep apnea)  G47.33 327.23    6. Anxiety  F41.9 300.00    7. Low blood sugar  E16.2 251.2      Follow-up and Dispositions    · Return in about 3 months (around 4/5/2022), or if symptoms worsen or fail to improve, for follow up. results and schedule of future studies reviewed with patient  reviewed diet, exercise and weight    cardiovascular risk and specific lipid/LDL goals reviewed  reviewed medications and side effects in detail    Defers med use for sleep  Discussed blood sugars  Check urine  Continue current medications       An electronic signature was used to authenticate this note.   -- Abran Hernadez MD

## 2022-01-05 NOTE — PROGRESS NOTES
RM 15    Chief Complaint   Patient presents with    Sleep Problem     difficulty sleeping at night     Urinary Frequency     at night     Blood sugar problem     reports low blood sugars - patient brought a log of blood sugars - lowest 66, highest 114 -        Visit Vitals  /79 (BP 1 Location: Left upper arm, BP Patient Position: Sitting, BP Cuff Size: Large adult)   Pulse 95   Temp 97.7 °F (36.5 °C) (Temporal)   Resp 17   Ht 5' 4\" (1.626 m)   Wt 308 lb (139.7 kg)   SpO2 97%   BMI 52.87 kg/m²       3 most recent PHQ Screens 7/14/2021   PHQ Not Done -   Little interest or pleasure in doing things Not at all   Feeling down, depressed, irritable, or hopeless Several days   Total Score PHQ 2 1   Trouble falling or staying asleep, or sleeping too much Not at all   Feeling tired or having little energy Nearly every day   Poor appetite, weight loss, or overeating Nearly every day   Feeling bad about yourself - or that you are a failure or have let yourself or your family down Several days   Trouble concentrating on things such as school, work, reading, or watching TV Not at all   Moving or speaking so slowly that other people could have noticed; or the opposite being so fidgety that others notice Not at all   Thoughts of being better off dead, or hurting yourself in some way Not at all   PHQ 9 Score 8   How difficult have these problems made it for you to do your work, take care of your home and get along with others Not difficult at all         1. Have you been to the ER, urgent care clinic since your last visit? Hospitalized since your last visit? No    2. Have you seen or consulted any other health care providers outside of the [address] [place] [address] since your last visit? Include any pap smears or colon screening.   Psychiatrist     Health Maintenance Due   Topic Date Due    COVID-19 Vaccine (3 - Booster for Thompson Peter series) 08/12/2021       Learning Assessment 10/24/2017   PRIMARY LEARNER Patient   BARRIERS PRIMARY LEARNER NONE   PRIMARY LANGUAGE ENGLISH   LEARNER PREFERENCE PRIMARY DEMONSTRATION   ANSWERED BY patient   RELATIONSHIP SELF       Patient completed covid 19 booster . AVS  education, follow up, and recommendations provided and addressed with patient.   services used to advise patient no

## 2022-01-06 LAB
APPEARANCE UR: ABNORMAL
BACTERIA URNS QL MICRO: ABNORMAL /HPF
BILIRUB UR QL: NEGATIVE
CAOX CRY URNS QL MICRO: ABNORMAL
COLOR UR: ABNORMAL
EPITH CASTS URNS QL MICRO: ABNORMAL /LPF
GLUCOSE UR STRIP.AUTO-MCNC: NEGATIVE MG/DL
HGB UR QL STRIP: NEGATIVE
KETONES UR QL STRIP.AUTO: ABNORMAL MG/DL
LEUKOCYTE ESTERASE UR QL STRIP.AUTO: ABNORMAL
NITRITE UR QL STRIP.AUTO: NEGATIVE
PH UR STRIP: 5.5 [PH] (ref 5–8)
PROT UR STRIP-MCNC: NEGATIVE MG/DL
RBC #/AREA URNS HPF: ABNORMAL /HPF (ref 0–5)
SP GR UR REFRACTOMETRY: 1.02 (ref 1–1.03)
UROBILINOGEN UR QL STRIP.AUTO: 0.2 EU/DL (ref 0.2–1)
WBC URNS QL MICRO: ABNORMAL /HPF (ref 0–4)

## 2022-01-06 NOTE — PROGRESS NOTES
No WBC's in the urine would argue against a bacterial infection. We'll see what the culture shows but an antibiotic is not indicated at this time.

## 2022-01-07 LAB
BACTERIA SPEC CULT: NORMAL
CC UR VC: NORMAL
SERVICE CMNT-IMP: NORMAL

## 2022-02-10 ENCOUNTER — VIRTUAL VISIT (OUTPATIENT)
Dept: OBGYN CLINIC | Age: 37
End: 2022-02-10
Payer: COMMERCIAL

## 2022-02-10 DIAGNOSIS — R35.0 URINARY FREQUENCY: ICD-10-CM

## 2022-02-10 DIAGNOSIS — R52 BODY ACHES: ICD-10-CM

## 2022-02-10 DIAGNOSIS — R11.0 NAUSEA: Primary | ICD-10-CM

## 2022-02-10 DIAGNOSIS — F32.A DEPRESSION, UNSPECIFIED DEPRESSION TYPE: ICD-10-CM

## 2022-02-10 PROCEDURE — 99213 OFFICE O/P EST LOW 20 MIN: CPT | Performed by: OBSTETRICS & GYNECOLOGY

## 2022-02-10 NOTE — PROGRESS NOTES
[employer] Video visit    Hyun Gerber is a 29 y.o. female who was seen by synchronous (real-time) audio-video technology on 2/10/2022. Consent: Hyun Gerber, who was seen by synchronous (real-time) audio-video technology, and/or her healthcare decision maker, is aware that this patient-initiated, Telehealth encounter on 2/10/2022 is a billable service, with coverage as determined by her insurance carrier. She is aware that she may receive a bill and has provided verbal consent to proceed: Yes. Problem Visit    Hyun Gerber is a 29 y.o. with pmh of PCOS (diagnosed clinically by anovulatory cycles and polycystic appearance to ovaries) now presents to discuss symptoms of hormonal nausea, body aches and urinary urgency/small volume voids and extreme \"PMS symptoms\" intermittently since Nexplanon insertion in Nov 2021. Pt reports she saw PCP in January and was ruled out for UTI and A1c was normal. Many of these symptoms she has experienced in the past prior to Nexplanon insertion and have been documented at prior visits, and were in fact reason to try to suppress ovulation with Nexplanon in the first place, because she felt like a lot of her symptoms were related to \"PMS\". Pt also with chronic symptoms of lumbar and thoracic back pain and chronic migraines. Denies ever receiving diagnosis of fibromyalgia. Previously saw Bree Waters at [facility]. Pelvic US in 2019 showing normal pelvic structures. She was started on norethindrone >1 year ago, which initially helped with symptoms, but has not been helping for the past couple of months. Also frequently sees chiropractor for adjustments as she has had chronic low back pain for years, but wonders if it could be explained by endometriosis as it always seems worse prior to menses. Adjustments do help. She has also seen orthopedic spine doctors, had MRI showing some degenerative changes.  She felt better for a period of time after PT and steroid injection of SI joint, reports this was \"the best she has felt\". Estrogen CI d/t multiple other comorbidities including HTN, migraines, etc. Pt is obese. She has been on metformin for management of insulin resistance and PCOS. She suffers from significant anxiety, depression and follows closely with psychiatry (who did not think trial of [employer] was a good idea for her mental health). She has also been worked up by neurologist (had some \"abnormal brain lesion\"), but reports MS was ruled out. Never sexually active. Also with occasional GERD, and diarrhea assoc with metformin. TV ULTRASOUND 6/15/21  THE UTERUS IS ANTEVERTED, NORMAL IN SIZE AND ECHOGENICITY. THE ENDOMETRIUM MEASURES 9.0MM IN THICKNESS. NO MASSES OR ABNORMALITIES ARE SEEN. RIGHT OVARY APPEARS ENLARGED WITH MULTIPLE FOLLICLES SEEN ON THE PERIPHERY. LEFT OVARY APPEARS WNL. NO FREE FLUID IS SEEN IN THE CDS. Ob/Gyn Hx:  G0   LMP-9/9/21  Menarche- 13  Menses- rare light bleeding since Nexplanon insertion  Contraception-Nexplanon placed Nov 2021  STI- denies  ? SA-never    Health maintenance:  Pap-within last 5 years, normal per patient  Gardasil-denies    Past Medical History:   Diagnosis Date    Anxiety     Depression     [facility] Elevated blood pressure 5/31/2011    Hypertension     Irregular menses     Migraine 8/2/2011    Obesity     DENISE (obstructive sleep apnea)     cpap at 8    PCOS (polycystic ovarian syndrome)     endocrine Dr. Isiah Lewis       Past Surgical History:   Procedure Laterality Date    HX HEENT      dental       Family History   Problem Relation Age of Onset    Diabetes Mother     Other Mother         obesity    OSTEOARTHRITIS Mother     Diabetes Father     Hypertension Father     Other Father         obesity    Prostate Cancer Father     Heart Disease Maternal Grandmother     Stroke Maternal Grandfather     Other Brother         obesity       Social History     Socioeconomic History    Marital status: SINGLE     Spouse name: Not on file    Number of children: Not on file    Years of education: Not on file    Highest education level: Not on file   Occupational History    Not on file   Tobacco Use    Smoking status: Never Smoker    Smokeless tobacco: Never Used   Substance and Sexual Activity    Alcohol use: Yes     Comment: rare    Drug use: No    Sexual activity: Never     Comment: Bith Conrol Pill used for acne tx   Other Topics Concern    Not on file   Social History Narrative    Not on file     Social Determinants of Health     Financial Resource Strain:     Difficulty of Paying Living Expenses: Not on file   Food Insecurity:     Worried About Running Out of Food in the Last Year: Not on file    Larry of Food in the Last Year: Not on file   Transportation Needs:     Lack of Transportation (Medical): Not on file    Lack of Transportation (Non-Medical): Not on file   Physical Activity:     Days of Exercise per Week: Not on file    Minutes of Exercise per Session: Not on file   Stress:     Feeling of Stress : Not on file   Social Connections:     Frequency of Communication with Friends and Family: Not on file    Frequency of Social Gatherings with Friends and Family: Not on file    Attends Church Services: Not on file    Active Member of [address] [place] [address] [employer] or Organizations: Not on file    Attends Club or Organization Meetings: Not on file    Marital Status: Not on file   Intimate Partner Violence:     Fear of Current or Ex-Partner: Not on file    Emotionally Abused: Not on file    Physically Abused: Not on file    Sexually Abused: Not on file   Housing Stability:     Unable to Pay for Housing in the Last Year: Not on file    Number of [place] in the Last Year: Not on file    Unstable Housing in the Last Year: Not on file       Current Outpatient Medications   Medication Sig Dispense Refill    etonogestreL 68 mg impl 1 Each once.       sertraline (ZOLOFT) 100 mg tablet sertraline 100 mg tablet   Take 2 tablets every day by oral route.  omeprazole (PRILOSEC) 20 mg capsule Take 1 Capsule by mouth daily. 90 Capsule 1    lithium carbonate 300 mg tablet Take 150 mg by mouth daily.  hydrocortisone (HYTONE) 2.5 % topical cream APPLY AS NEEDED FOR FLARES ON CHEST      metroNIDAZOLE (METROCREAM) 0.75 % topical cream       triamcinolone acetonide (KENALOG) 0.1 % topical cream Apply  to affected area two (2) times a day. use thin layer x 3-5 days 30 g 1    ketoconazole (NIZORAL) 2 % topical cream Apply  to affected area two (2) times a day. 30 g 1    LORazepam (ATIVAN) 0.5 mg tablet Take 0.5 mg by mouth. Take one half   Indications: patient reports takes 1-2 tablets up to 3 times daily as needed      metFORMIN ER (GLUCOPHAGE XR) 500 mg tablet TAKE 4 TABLETS DAILY WITH DINNER 120 tablet 11    sertraline (ZOLOFT) 50 mg tablet sertraline 50 mg tablet   Take 1 tablet every day by oral route.  polyethylene glycol (MIRALAX) 17 gram/dose powder Take 17 g by mouth daily. clean out - 7 capfuls of miralax in a 32 oz gatorade - drink over a few hours. May need to repeat x 2-3 days depending on results. (Patient not taking: Reported on 2/10/2022) 527 g 5    sertraline (ZOLOFT) 100 mg tablet Take 1 Tab by mouth daily.  (Patient not taking: Reported on 1/5/2022) 90 Tab 1       Allergies   Allergen Reactions    Topamax [Topiramate] Other (comments)     Suicidal thoughts       Review of Systems - History obtained from the patient  Constitutional: negative for weight loss, fever, night sweats, +fatigue  HEENT: negative for hearing loss, earache, congestion, snoring, sorethroat  CV: negative for chest pain, palpitations, edema  Resp: negative for cough, shortness of breath, wheezing  GI: negative for change in bowel habits, abdominal pain, black or bloody stools, +\"hormonal nausea\"  : negative for frequency, dysuria, hematuria, vaginal discharge, +pelvic/back pain, +urinary frequency/urgency/small volume voids  MSK: negative for back pain, joint pain, muscle pain  Breast: negative for breast lumps, nipple discharge, galactorrhea, +left breast tenderness  Skin :negative for itching, rash, hives  Neuro: negative for dizziness, headache, confusion, weakness  Psych: negative for anxiety, depression, change in mood  Heme/lymph: negative for bleeding, bruising, pallor    Physical Exam    Objective:     General: alert, cooperative, no distress   Mental  status: mental status: alert, oriented to person, place, and time, normal mood, behavior, speech, dress, motor activity, and thought processes   Resp: resp: normal effort and no respiratory distress   Neuro: neuro: no gross deficits   Skin: skin: no discoloration or lesions of concern on visible areas   Due to this being a TeleHealth evaluation, many elements of the physical examination are unable to be assessed. Assessment/Plan:  29 y.o. G0 presenting for evaluation of hormonal nausea, urinary frequency and body aches intermittently since Nexplanon insertion (but also had many of these symptoms prior to insertion of implant, so unclear if correlated). Pt with long h/o lumbar and thoracic back pain, body aches, \"PMS symptoms\", anxiety/depression, extreme fatigue, nausea and breast pain prior to menses. Estrogen CI and has not done well on norethindrone in the past, declines IUD/depo/orilissa. Discussed favor MSK cause of LBP given history and context, but cannot exclude endometriosis or hormonally mediated symptoms.  Also cannot exclude a more systemic/autoimmune/rheumatologic/neuropathic cause like fibromyalgia, etc, particularly given extent and diffuse/generalized nature of symptoms.     -discussed options of discontinuing nexplanon at this time vs. Continuing for additional 3 months to see if body adjusts, discussed that it is unclear that Nexplanon is contributing to symptoms, but if not improving symptoms, would be happy to remove at next visit  -unfortunately options for endometrial protection and menstrual suppression limited to progestin-only options in this patient (d/t HTN and migraine history)  -pt spoke with psychiatrist who does NOT recommend Kaden Parson due to concerns about exacerbating mood symptoms  -advised pt see internist or rheumatologist to discuss systemic causes of symptoms such as fibromyalgia, discussed possible trial of neuropathic pain agents such as lyrica to help with symptoms and also review of medication list to see if any of her current medications could be contributing to her symptoms  -referre to spine/pain specialist at prior visit  -consider diagnostic laparoscopy in future to evaluate for endometriosis if pelvic symptoms worsen, but at this time, favor MSK etiology and do not believe this would be helped by surgical intervention  -encourage ongoing management with orthopedics and chiropractor  -encourage healthy diet, exercise, weight loss, etc    RTC 1 year for AE or sooner jose c Khan MD  2/10/2022  12:20 PM           We discussed the expected course, resolution and complications of the diagnosis(es) in detail. Medication risks, benefits, costs, interactions, and alternatives were discussed as indicated. I advised her to contact the office if her condition worsens, changes or fails to improve as anticipated. She expressed understanding with the diagnosis(es) and plan. Watson Robison is a 29 y.o. female who was evaluated by a video visit encounter for concerns as above. Patient identification was verified prior to start of the visit. A caregiver was present when appropriate. Due to this being a TeleHealth encounter (During [facility]- public health emergency), evaluation of the following organ systems was limited: Vitals/Constitutional/EENT/Resp/CV/GI//MS/Neuro/Skin/Heme-Lymph-Imm.   Pursuant to the emergency declaration under the [facility], [address] waiver authority and the [employer] and [employer] Act, this Virtual  Visit was conducted, with patient's (and/or legal guardian's) consent, to reduce the patient's risk of exposure to COVID-19 and provide necessary medical care. Services were provided through a video synchronous discussion virtually to substitute for in-person clinic visit. Patient and provider were located at their individual homes.       Tg Riley

## 2022-02-21 DIAGNOSIS — E28.2 PCOS (POLYCYSTIC OVARIAN SYNDROME): ICD-10-CM

## 2022-02-21 DIAGNOSIS — N94.6 DYSMENORRHEA: Primary | ICD-10-CM

## 2022-03-11 ENCOUNTER — OFFICE VISIT (OUTPATIENT)
Dept: OBGYN CLINIC | Age: 37
End: 2022-03-11
Payer: COMMERCIAL

## 2022-03-11 VITALS
SYSTOLIC BLOOD PRESSURE: 132 MMHG | DIASTOLIC BLOOD PRESSURE: 78 MMHG | HEIGHT: 64 IN | BODY MASS INDEX: 50.02 KG/M2 | WEIGHT: 293 LBS

## 2022-03-11 DIAGNOSIS — R35.0 URINARY FREQUENCY: ICD-10-CM

## 2022-03-11 DIAGNOSIS — E28.2 PCOS (POLYCYSTIC OVARIAN SYNDROME): ICD-10-CM

## 2022-03-11 DIAGNOSIS — N94.3 PMS (PREMENSTRUAL SYNDROME): Primary | ICD-10-CM

## 2022-03-11 DIAGNOSIS — G43.809 OTHER MIGRAINE WITHOUT STATUS MIGRAINOSUS, NOT INTRACTABLE: ICD-10-CM

## 2022-03-11 DIAGNOSIS — E66.01 MORBID OBESITY (HCC): ICD-10-CM

## 2022-03-11 PROCEDURE — 99214 OFFICE O/P EST MOD 30 MIN: CPT | Performed by: OBSTETRICS & GYNECOLOGY

## 2022-03-11 NOTE — PROGRESS NOTES
Problem Visit-Complete    Chief Complaint   Premenstrual Syndrome      HPI  Gera Castaneda is a 29 y.o. female who presents for the evaluation of bad PMS symptoms. Patient's last menstrual period was 02/12/2022. The patient complains of around her periods is having bad back pain, headaches, nausea, cramping and fatigue. The symptoms are worsening since nexplanon that was placed 11/2021. DUB since then. OCP in the distant past, mostly for acne. Was on POP for a while, then did not help. Migraine dx pre-empted further use of estrogen. Has Nexplanon. They started several years ago. Since then they have become worse. H/O urinary urgency in the past, used to be just prior to menses, now just random. She reports that she thinks it may be endometriosis, but has been told by two other physicians that it is not that. She was offered [employer], but she and her psychiatrist agreed that that would not be a great idea. Past Medical History:   Diagnosis Date    Anxiety     Depression     [facility] Elevated blood pressure 5/31/2011    Hypertension     Irregular menses     Migraine 8/2/2011    Obesity     DENISE (obstructive sleep apnea)     cpap at 8    PCOS (polycystic ovarian syndrome)     endocrine Dr. Radha Rothman     No past surgical history on file.   Social History     Occupational History    Not on file   Tobacco Use    Smoking status: Never Smoker    Smokeless tobacco: Never Used   Substance and Sexual Activity    Alcohol use: Yes     Comment: rare    Drug use: No    Sexual activity: Never     Family History   Problem Relation Age of Onset    Diabetes Mother     Other Mother         obesity    OSTEOARTHRITIS Mother     Diabetes Father     Hypertension Father     Other Father         obesity    Prostate Cancer Father     Heart Disease Maternal Grandmother     Stroke Maternal Grandfather     Other Brother         obesity       Allergies   Allergen Reactions    Topamax [Topiramate] Other (comments)     Suicidal thoughts     Prior to Admission medications    Medication Sig Start Date End Date Taking? Authorizing Provider   magnesium oxide,aspartate,citr 400 mg magnesium cap Take  by mouth. Yes Provider, Historical   etonogestreL 68 mg impl 1 Each once. Yes Provider, Historical   sertraline (ZOLOFT) 100 mg tablet 250 mg. Yes Provider, Historical   omeprazole (PRILOSEC) 20 mg capsule Take 1 Capsule by mouth daily. 11/29/21  Yes Himanshu Navarrete MD   lithium carbonate 300 mg tablet Take 150 mg by mouth daily. 9/16/21  Yes Provider, Historical   polyethylene glycol (MIRALAX) 17 gram/dose powder Take 17 g by mouth daily. clean out - 7 capfuls of miralax in a 32 oz gatorade - drink over a few hours. May need to repeat x 2-3 days depending on results. 7/30/21  Yes Himanshu Navarrete MD   hydrocortisone (HYTONE) 2.5 % topical cream APPLY AS NEEDED FOR FLARES ON CHEST 5/24/21  Yes Provider, Historical   metroNIDAZOLE (METROCREAM) 0.75 % topical cream  5/24/21  Yes Provider, Historical   triamcinolone acetonide (KENALOG) 0.1 % topical cream Apply  to affected area two (2) times a day. use thin layer x 3-5 days 6/1/21  Yes Himanshu Navarrete MD   ketoconazole (NIZORAL) 2 % topical cream Apply  to affected area two (2) times a day. 5/20/19  Yes Himanshu Navarrete MD   LORazepam (ATIVAN) 0.5 mg tablet Take 0.5 mg by mouth. Take one half   Indications: patient reports takes 1-2 tablets up to 3 times daily as needed   Yes Provider, Historical   metFORMIN ER (GLUCOPHAGE XR) 500 mg tablet TAKE 4 TABLETS DAILY WITH DINNER 11/20/14  Yes Himanshu Navarrete MD   sertraline (ZOLOFT) 50 mg tablet sertraline 50 mg tablet   Take 1 tablet every day by oral route. Provider, Historical   sertraline (ZOLOFT) 100 mg tablet Take 1 Tab by mouth daily.   Patient not taking: Reported on 1/5/2022 7/16/14   Hmianshu Navarrete MD        Review of Systems: History obtained from the patient  Constitutional: negative for weight loss, fever, night sweats  HEENT: negative for hearing loss, earache, congestion, snoring, sorethroat  CV: negative for chest pain, palpitations, edema  Resp: negative for cough, shortness of breath, wheezing  Breast: negative for breast lumps, nipple discharge, galactorrhea  GI: negative for change in bowel habits, abdominal pain, black or bloody stools  : negative for frequency, dysuria, hematuria, vaginal discharge  MSK: negative for back pain, joint pain, muscle pain  Skin: negative for itching, rash, hives  Neuro: negative for dizziness, headache, confusion, weakness  Psych: negative for anxiety, depression, change in mood  Heme/lymph: negative for bleeding, bruising, pallor    Objective:  Visit Vitals  /78   Ht 5' 4\" (1.626 m)   Wt 315 lb (142.9 kg)   LMP 02/12/2022   BMI 54.07 kg/m²       Physical Exam:     Constitutional  · Appearance: morbidly obeses, otherwise well-nourished, well developed, alert, in no acute distress    HENT  · Head and Face: appears normal    Skin  · General Inspection: no rash, no lesions identified    Neurologic/Psychiatric  · Mental Status:  · Orientation: grossly oriented to person, place and time  · Mood and Affect: mood normal, affect appropriate    Assessment:  Discussed difficulty treating PMS and possible mild endometriosis with h/o complicated migraine precluding estrogen containing contraception, psychiatric reasons for not taking Orilissa or Lupron. With nowhere to go with postop medication, it seems fruitless to do L/S looking for mild endometriosis. Discussed AUGS lifestyle changes to help with bladder urgency. Plan:   Call me if she wants to consider OCP in spite of migraine hx. Other than that, difficult to come up with alternatives that might help her situation. RTO prn if symptoms persist or worsen. Instructions given to pt. Handouts given to pt.

## 2022-03-19 PROBLEM — K64.9 HEMORRHOIDS: Status: ACTIVE | Noted: 2017-10-30

## 2022-03-19 PROBLEM — F33.9 RECURRENT DEPRESSION (HCC): Status: ACTIVE | Noted: 2017-12-27

## 2022-03-27 ENCOUNTER — PATIENT MESSAGE (OUTPATIENT)
Dept: INTERNAL MEDICINE CLINIC | Age: 37
End: 2022-03-27

## 2022-03-27 DIAGNOSIS — E28.2 PCOS (POLYCYSTIC OVARIAN SYNDROME): ICD-10-CM

## 2022-03-28 RX ORDER — METFORMIN HYDROCHLORIDE 500 MG/1
TABLET, EXTENDED RELEASE ORAL
Qty: 360 TABLET | Refills: 3 | Status: SHIPPED | OUTPATIENT
Start: 2022-03-28

## 2022-03-28 NOTE — TELEPHONE ENCOUNTER
LOV 1/5/2022  Future Appointments   Date Time Provider Quentin Thomas   4/1/2022  2:20 PM Cristian Slaughetr MD [facility] BS AMB   4/11/2022  3:00 PM Latha Moreno MD [facility] BS AMB

## 2022-03-28 NOTE — TELEPHONE ENCOUNTER
From: Feliciano Kee  To: Dmitry San MD  Sent: 3/27/2022 8:46 PM EDT  Subject: Rx for metformin     Dr. Tan Garces, will you write a new rx for my metformin since I am no longer seeing the endocrinologist? It's metformin extended release 500 MG tabs. 4 pills a day. Can you send a 90 day supply rx to [address] [place] [address] home delivery pharmacy? Please contact me if you need any additional information. Thanks!

## 2022-03-31 ENCOUNTER — HOSPITAL ENCOUNTER (EMERGENCY)
Age: 37
Discharge: HOME | End: 2022-03-31
Attending: EMERGENCY MEDICINE
Payer: COMMERCIAL

## 2022-03-31 VITALS
OXYGEN SATURATION: 100 % | DIASTOLIC BLOOD PRESSURE: 109 MMHG | TEMPERATURE: 98.6 F | SYSTOLIC BLOOD PRESSURE: 154 MMHG | BODY MASS INDEX: 54.76 KG/M2 | WEIGHT: 293 LBS | RESPIRATION RATE: 16 BRPM | HEART RATE: 92 BPM

## 2022-03-31 DIAGNOSIS — G43.109 MIGRAINE AURA WITHOUT HEADACHE: Primary | ICD-10-CM

## 2022-03-31 PROCEDURE — 99282 EMERGENCY DEPT VISIT SF MDM: CPT

## 2022-03-31 NOTE — ED PROVIDER NOTES
29 yr old w hx of complex migraines presents with symptoms identical to previous migraine auras but without headache. She took a dose of Excedrin migraine at symptom onset with resolution of her symptoms soon after. Symptoms included spotty vision loss followed by numbness in the right hand and face. She has no current symptoms or deficits, she has had these symptoms with migraines in the past, her migraines are infrequent. The history is provided by the patient. Past Medical History:   Diagnosis Date    Anxiety     Depression     Cynthia Roup   [place] Jae Elevated blood pressure 5/31/2011    Hypertension     Irregular menses     Migraine 8/2/2011    Obesity     DENISE (obstructive sleep apnea)     cpap at 8    PCOS (polycystic ovarian syndrome)     endocrine Dr. Diana Villalba       History reviewed. No pertinent surgical history.       Family History:   Problem Relation Age of Onset    Diabetes Mother     Other Mother         obesity    OSTEOARTHRITIS Mother     Diabetes Father     Hypertension Father     Other Father         obesity    Prostate Cancer Father     Heart Disease Maternal Grandmother     Stroke Maternal Grandfather     Other Brother         obesity       Social History     Socioeconomic History    Marital status: SINGLE     Spouse name: Not on file    Number of children: Not on file    Years of education: Not on file    Highest education level: Not on file   Occupational History    Not on file   Tobacco Use    Smoking status: Never Smoker    Smokeless tobacco: Never Used   Substance and Sexual Activity    Alcohol use: Yes     Comment: rare    Drug use: No    Sexual activity: Never   Other Topics Concern    Not on file   Social History Narrative    Not on file     Social Determinants of Health     Financial Resource Strain:     Difficulty of Paying Living Expenses: Not on file   Food Insecurity:     Worried About Running Out of Food in the Last Year: Not on file    Larry rebolledo Food in the Last Year: Not on file   Transportation Needs:     Lack of Transportation (Medical): Not on file    Lack of Transportation (Non-Medical): Not on file   Physical Activity:     Days of Exercise per Week: Not on file    Minutes of Exercise per Session: Not on file   Stress:     Feeling of Stress : Not on file   Social Connections:     Frequency of Communication with Friends and Family: Not on file    Frequency of Social Gatherings with Friends and Family: Not on file    Attends Catholic Services: Not on file    Active Member of [address] [place] [address] [employer] or Organizations: Not on file    Attends Club or Organization Meetings: Not on file    Marital Status: Not on file   Intimate Partner Violence:     Fear of Current or Ex-Partner: Not on file    Emotionally Abused: Not on file    Physically Abused: Not on file    Sexually Abused: Not on file   Housing Stability:     Unable to Pay for Housing in the Last Year: Not on file    Number of [place] in the Last Year: Not on file    Unstable Housing in the Last Year: Not on file         ALLERGIES: Topamax [topiramate]    Review of Systems   Eyes: Positive for visual disturbance. Neurological: Positive for numbness. Negative for dizziness, speech difficulty, weakness, light-headedness and headaches. All other systems reviewed and are negative. Vitals:    03/31/22 1833   BP: (!) 154/109   Pulse: 92   Resp: 16   Temp: 98.6 °F (37 °C)   SpO2: 100%   Weight: 144.7 kg (319 lb 0.1 oz)            Physical Exam  Vitals and nursing note reviewed. Constitutional:       Appearance: She is well-developed. HENT:      Head: Normocephalic and atraumatic. Eyes:      Pupils: Pupils are equal, round, and reactive to light. Cardiovascular:      Rate and Rhythm: Normal rate and regular rhythm. Pulmonary:      Effort: Pulmonary effort is normal.      Breath sounds: Normal breath sounds. Abdominal:      General: There is no distension.       Palpations: Abdomen is soft.      Tenderness: There is no abdominal tenderness. Musculoskeletal:      Cervical back: Normal range of motion and neck supple. Skin:     General: Skin is warm and dry. Capillary Refill: Capillary refill takes less than 2 seconds. Neurological:      General: No focal deficit present. Mental Status: She is alert and oriented to person, place, and time. Cranial Nerves: No cranial nerve deficit. Sensory: No sensory deficit. Motor: No weakness. Coordination: Coordination normal.      Gait: Gait normal.   Psychiatric:         Attention and Perception: Attention and perception normal.         Mood and Affect: Mood is anxious. Affect is tearful. Behavior: Behavior normal.          MDM       Procedures        The patient presented to the emergency department with symptoms consistent with a migraine aura. The patient is now resting comfortably and feels better, is alert, talkative, interactive and in no distress. The patient appears well and is able to tolerate PO fluids. The repeat examination is unremarkable and benign. The patient is neurologically intact, has a normal mental status, and is ambulatory in the ED. The history, exam, diagnostic testing (if any) and the patient's current condition do not suggest meningitis, stroke, sepsis, subarachnoid hemorrhage, intracranial bleeding, encephalitis, temporal arteritis or other significant pathology to warrant further testing, continued ED treatment, admission, neurological consultation, or other specialist evaluation at this point. The vital signs have been stable. The patient's condition is stable and appropriate for discharge. The patient will pursue further outpatient evaluation with the primary care physician or other designated or consulting physician as indicated in the discharge instructions.

## 2022-03-31 NOTE — ED NOTES
Pt resting comfortably. Pt verbalizes readiness for discharge. PT verbalizes understanding to seek medical attention as needed.

## 2022-04-01 ENCOUNTER — OFFICE VISIT (OUTPATIENT)
Dept: OBGYN CLINIC | Age: 37
End: 2022-04-01
Payer: COMMERCIAL

## 2022-04-01 VITALS — WEIGHT: 293 LBS | DIASTOLIC BLOOD PRESSURE: 86 MMHG | SYSTOLIC BLOOD PRESSURE: 142 MMHG | BODY MASS INDEX: 54.41 KG/M2

## 2022-04-01 DIAGNOSIS — Z30.46 ENCOUNTER FOR NEXPLANON REMOVAL: Primary | ICD-10-CM

## 2022-04-01 PROCEDURE — 11982 REMOVE DRUG IMPLANT DEVICE: CPT | Performed by: OBSTETRICS & GYNECOLOGY

## 2022-04-01 RX ORDER — DILTIAZEM HCL
POWDER (GRAM) MISCELLANEOUS
COMMUNITY
Start: 2022-03-15 | End: 2022-05-03

## 2022-04-01 NOTE — PROGRESS NOTES
Nexplanon Removal Procedure Note    Indication:  Venus Ch is a 29 y.o. [de-identified] , female who presents for Nexplanon removal due to increased moodiness. Chart reviewed for the following:  Carol BRICE, have reviewed the History, Physical and updated the Allergic reactions for Venus Ch. TIME OUT performed immediately prior to start of procedure:  Carol BRICE, have performed the following reviews on Venus Ch prior to the start of the procedure:          * Patient was identified by name and date of birth   * Agreement on procedure being performed was verified  * Risks and Benefits explained to the patient  * Procedure site verified and marked as necessary  * Consent was signed and verified     Time: 2:35PM  Date of procedure: 4/1/2022  Procedure performed by:  Dr. Gail Rodrigues    Procedure:  She was positioned so the site of her implant was visable and easily accessible. The implant was located by palpation. The end of the implant nearest the elbow was marked. The injection site was cleaned with an alcohol swab. Using a 27 gauge needle on a 5cc syringe and 1% lidocaine, 3cc were infiltrated as a intradermal wheal and underneath the end of the implant closest to the elbow. Lidocaine was allowed to infiltrate. Sterile gloves were donned. The operative site was then cleansed with Betadine. Downward pressure was applied on the end of the implant nearest the axilla and a 2-3mm incision was made in the longitudinal direction of the arm at the tip of the implant closest to the elbow. The implant was then pushed gently toward the incision until the tip was visible. The fibrous capsule was opened with a combination of blunt and sharp dissection. The implant was grasped with mosquito forceps and removed intact. It measured a full 4 cm in length. The site was cleaned an steri-strips and a band aid applied.      Post-procedure:  She was told to remove the dressing in 12-24 hours, to keep the incision area dry for 24 hours and to remove the Steristrip in 5-7 days.      Anand Wallace MD

## 2022-04-01 NOTE — PATIENT INSTRUCTIONS
Migraine Headache: Care Instructions  Overview     Migraines are painful, throbbing headaches that often start on one side of the head. They may cause nausea and vomiting and make you sensitive to light, sound, or smell. Without treatment, migraines can last from 4 hours to a few days. Medicines can help prevent migraines or stop them after they have started. Your doctor can help you find which ones work best for you. Follow-up care is a key part of your treatment and safety. Be sure to make and go to all appointments, and call your doctor if you are having problems. It's also a good idea to know your test results and keep a list of the medicines you take. How can you care for yourself at home? · Do not drive if you have taken a prescription pain medicine. · Rest in a quiet, dark room until your headache is gone. Close your eyes, and try to relax or go to sleep. Don't watch TV or read. · Put a cold, moist cloth or cold pack on the painful area for 10 to 20 minutes at a time. Put a thin cloth between the cold pack and your skin. · Use a warm, moist towel or a heating pad set on low to relax tight shoulder and neck muscles. · Have someone gently massage your neck and shoulders. · Take your medicines exactly as prescribed. Call your doctor if you think you are having a problem with your medicine. You will get more details on the specific medicines your doctor prescribes. · Don't take medicine for headache pain too often. Talk to your doctor if you are taking medicine more than 2 days a week to stop a headache. Taking too much pain medicine can lead to more headaches. These are called medicine-overuse headaches. To prevent migraines  · Keep a headache diary so you can figure out what triggers your headaches. Avoiding triggers may help you prevent headaches. Record when each headache began, how long it lasted, and what the pain was like.  Write down any other symptoms you had with the headache, such as nausea, flashing lights or dark spots, or sensitivity to bright light or loud noise. Note if the headache occurred near your period. List anything that might have triggered the headache. Triggers may include certain foods (chocolate, cheese, wine) or odors, smoke, bright light, stress, or lack of sleep. · If your doctor has prescribed medicine for your migraines, take it as directed. You may have medicine that you take only when you get a migraine and medicine that you take all the time to help prevent migraines. ? If your doctor has prescribed medicine for when you get a headache, take it at the first sign of a migraine, unless your doctor has given you other instructions. ? If your doctor has prescribed medicine to prevent migraines, take it exactly as prescribed. Call your doctor if you think you are having a problem with your medicine. · Find healthy ways to deal with stress. Migraines are most common during or right after stressful times. Try finding ways to reduce stress like practicing mindfulness or deep breathing exercises. · Get plenty of sleep and exercise. But be careful to not push yourself too hard during exercise. It may trigger a headache. · Eat meals on a regular schedule. Avoid foods and drinks that often trigger migraines. These include chocolate, alcohol (especially red wine and port), aspartame, monosodium glutamate (MSG), and some additives found in foods (such as hot dogs, ferris, cold cuts, aged cheeses, and pickled foods). · Limit caffeine. Don't drink too much coffee, tea, or soda. But don't quit caffeine suddenly. That can also give you migraines. · Do not smoke or allow others to smoke around you. If you need help quitting, talk to your doctor about stop-smoking programs and medicines. These can increase your chances of quitting for good. · If you are taking birth control pills or hormone therapy, talk to your doctor about whether they are triggering your migraines.   When should you call for help? Call 911 anytime you think you may need emergency care. For example, call if:    · You have signs of a stroke. These may include:  ? Sudden numbness, paralysis, or weakness in your face, arm, or leg, especially on only one side of your body. ? Sudden vision changes. ? Sudden trouble speaking. ? Sudden confusion or trouble understanding simple statements. ? Sudden problems with walking or balance. ? A sudden, severe headache that is different from past headaches. Call your doctor now or seek immediate medical care if:    · You have new or worse nausea and vomiting.     · You have a new or higher fever.     · Your headache gets much worse. Watch closely for changes in your health, and be sure to contact your doctor if:    · You are not getting better after 2 days (48 hours). Where can you learn more? Go to http://www.gray.com/  Enter E177 in the search box to learn more about \"Migraine Headache: Care Instructions. \"  Current as of: December 13, 2021               Content Version: 13.2  © 2006-2022 [employer]. Care instructions adapted under license by [employer] (which disclaims liability or warranty for this information). If you have questions about a medical condition or this instruction, always ask your healthcare professional. [address] any warranty or liability for your use of this information.

## 2022-04-11 ENCOUNTER — OFFICE VISIT (OUTPATIENT)
Dept: INTERNAL MEDICINE CLINIC | Age: 37
End: 2022-04-11
Payer: COMMERCIAL

## 2022-04-11 VITALS
SYSTOLIC BLOOD PRESSURE: 135 MMHG | HEART RATE: 87 BPM | OXYGEN SATURATION: 98 % | WEIGHT: 293 LBS | TEMPERATURE: 98.4 F | HEIGHT: 64 IN | DIASTOLIC BLOOD PRESSURE: 82 MMHG | BODY MASS INDEX: 50.02 KG/M2

## 2022-04-11 DIAGNOSIS — G47.33 OSA (OBSTRUCTIVE SLEEP APNEA): ICD-10-CM

## 2022-04-11 DIAGNOSIS — F41.9 ANXIETY: ICD-10-CM

## 2022-04-11 DIAGNOSIS — E66.01 OBESITY, MORBID (HCC): ICD-10-CM

## 2022-04-11 DIAGNOSIS — E28.2 PCOS (POLYCYSTIC OVARIAN SYNDROME): ICD-10-CM

## 2022-04-11 DIAGNOSIS — R23.8 SKIN PIMPLE: ICD-10-CM

## 2022-04-11 DIAGNOSIS — K58.9 IRRITABLE BOWEL SYNDROME, UNSPECIFIED TYPE: ICD-10-CM

## 2022-04-11 DIAGNOSIS — G43.909 MIGRAINE WITHOUT STATUS MIGRAINOSUS, NOT INTRACTABLE, UNSPECIFIED MIGRAINE TYPE: Primary | ICD-10-CM

## 2022-04-11 DIAGNOSIS — J30.2 SEASONAL ALLERGIC RHINITIS, UNSPECIFIED TRIGGER: ICD-10-CM

## 2022-04-11 PROCEDURE — 99214 OFFICE O/P EST MOD 30 MIN: CPT | Performed by: INTERNAL MEDICINE

## 2022-04-11 RX ORDER — CLINDAMYCIN PHOSPHATE 10 MG/G
GEL TOPICAL 2 TIMES DAILY
Qty: 60 G | Refills: 5 | Status: SHIPPED | OUTPATIENT
Start: 2022-04-11

## 2022-04-11 NOTE — PROGRESS NOTES
HPI:  established patient  Presents for f/u migraine, mood, etc    Buttocks lesion  +bled and painful. Migraine - sees neuro tomorrow  Arm numbness and vision changes with most recent. Saw sleep med - awaiting CPAP replacement due to recall of product  77% compliance with use - when doesn't use it it is due to falling asleep prior to applying it - not that she doesn't want to . Works when used. implanon removed 4/1/22  Unclear at this point how much different she feels  Some uterine discharge within the past week. Past medical, Social, and Family history reviewed    Prior to Admission medications    Medication Sig Start Date End Date Taking? Authorizing Provider   Diltiazem HCl powd Apply TID externally to the anus, as instructed. 3/15/22 5/3/22 Yes Provider, Historical   metFORMIN ER (GLUCOPHAGE XR) 500 mg tablet TAKE 4 TABLETS DAILY WITH DINNER 3/28/22  Yes Jeana Jimenez MD   magnesium oxide,aspartate,citr 400 mg magnesium cap Take  by mouth. Yes Provider, Historical   sertraline (ZOLOFT) 100 mg tablet 250 mg. Yes Provider, Historical   omeprazole (PRILOSEC) 20 mg capsule Take 1 Capsule by mouth daily. 11/29/21  Yes Jeana Jimenez MD   lithium carbonate 300 mg tablet Take 150 mg by mouth daily. 9/16/21  Yes Provider, Historical   polyethylene glycol (MIRALAX) 17 gram/dose powder Take 17 g by mouth daily. clean out - 7 capfuls of miralax in a 32 oz gatorade - drink over a few hours. May need to repeat x 2-3 days depending on results. 7/30/21  Yes Jeana Jimenez MD   hydrocortisone (HYTONE) 2.5 % topical cream APPLY AS NEEDED FOR FLARES ON CHEST 5/24/21  Yes Provider, Historical   metroNIDAZOLE (METROCREAM) 0.75 % topical cream  5/24/21  Yes Provider, Historical   triamcinolone acetonide (KENALOG) 0.1 % topical cream Apply  to affected area two (2) times a day.  use thin layer x 3-5 days 6/1/21  Yes Jeana Jimenez MD   ketoconazole (NIZORAL) 2 % topical cream Apply  to affected area two (2) times a day. 5/20/19  Yes Petr Alexander MD   LORazepam (ATIVAN) 0.5 mg tablet Take 0.5 mg by mouth. Take one half   Indications: patient reports takes 1-2 tablets up to 3 times daily as needed   Yes Provider, Historical          ROS  Complete ROS reviewed and negative or stable except as noted in HPI. Physical Exam  Vitals and nursing note reviewed. Constitutional:       General: She is not in acute distress. Comments: Obese    HENT:      Head: Normocephalic and atraumatic. Eyes:      General: No scleral icterus. Pupils: Pupils are equal, round, and reactive to light. Cardiovascular:      Rate and Rhythm: Normal rate and regular rhythm. Heart sounds: Normal heart sounds. No murmur heard. No friction rub. No gallop. Pulmonary:      Effort: Pulmonary effort is normal. No respiratory distress. Breath sounds: Normal breath sounds. No wheezing or rales. Abdominal:      General: Bowel sounds are normal. There is no distension. Palpations: Abdomen is soft. Tenderness: There is no abdominal tenderness. Comments: Obese    Musculoskeletal:         General: Normal range of motion. Cervical back: Normal range of motion and neck supple. Skin:     General: Skin is warm. Findings: Lesion (right buttocks x 2 c/w recently healed comedonal or pustular lesions, but no active infection, no fluctuance, no induration.) present. No rash. Neurological:      Mental Status: She is alert and oriented to person, place, and time. Motor: No abnormal muscle tone. Prior labs reviewed. Assessment/Plan:    ICD-10-CM ICD-9-CM    1. Migraine without status migrainosus, not intractable, unspecified migraine type  G43.909 346.90    2. Skin pimple  R23.8 709.8    3. PCOS (polycystic ovarian syndrome)  E28.2 256.4    4. DENISE (obstructive sleep apnea)  G47.33 327.23    5. Anxiety  F41.9 300.00    6. Obesity, morbid ([address]  E66.01 278.01    7.  Irritable bowel syndrome, unspecified type  K58.9 564.1    8. Seasonal allergic rhinitis, unspecified trigger  J30.2 477.9      Follow-up and Dispositions    · Return in about 3 months (around 7/11/2022), or if symptoms worsen or fail to improve, for follow up. .        results and schedule of future studies reviewed with patient  reviewed diet, exercise and weight   cardiovascular risk and specific lipid/LDL goals reviewed  reviewed medications and side effects in detail    Review abortive migraine meds with neuro  Continue CPAP   See GYN as scheduled 6/17/22. clindagel proph for skin lesions  Continue other current medications         An electronic signature was used to authenticate this note.   -- Samina Sim MD

## 2022-04-11 NOTE — PROGRESS NOTES
RM 15    Chief Complaint   Patient presents with    Follow-up     seen January for sleep issues    ED Follow-up     pt seen 3/31/22 for migraine     Skin Problem     states there is a sore inside the buttock area. sometimes hurts to sit. had bled a few times. Visit Vitals  /82   Pulse 87   Temp 98.4 °F (36.9 °C)   Ht 5' 4\" (1.626 m)   Wt 316 lb (143.3 kg)   SpO2 98%   BMI 54.24 kg/m²       3 most recent PHQ Screens 4/11/2022   PHQ Not Done -   Little interest or pleasure in doing things Several days   Feeling down, depressed, irritable, or hopeless Several days   Total Score PHQ 2 2   Trouble falling or staying asleep, or sleeping too much -   Feeling tired or having little energy -   Poor appetite, weight loss, or overeating -   Feeling bad about yourself - or that you are a failure or have let yourself or your family down -   Trouble concentrating on things such as school, work, reading, or watching TV -   Moving or speaking so slowly that other people could have noticed; or the opposite being so fidgety that others notice -   Thoughts of being better off dead, or hurting yourself in some way -   PHQ 9 Score -   How difficult have these problems made it for you to do your work, take care of your home and get along with others -         1. Have you been to the ER, urgent care clinic since your last visit? Hospitalized since your last visit? [facility] 3/31/22 seen notes. 2. Have you seen or consulted any other health care providers outside of the [address] [place] [address] since your last visit? Include any pap smears or colon screening. Therapist with [facility]    There are no preventive care reminders to display for this patient.     Learning Assessment 10/24/2017   PRIMARY LEARNER Patient   BARRIERS PRIMARY LEARNER NONE   PRIMARY LANGUAGE ENGLISH   LEARNER PREFERENCE PRIMARY DEMONSTRATION   ANSWERED BY patient   RELATIONSHIP SELF         AVS  education, follow up, and recommendations provided and addressed with patient.   services used to advise patient -no

## 2022-08-10 ENCOUNTER — OFFICE VISIT (OUTPATIENT)
Dept: INTERNAL MEDICINE CLINIC | Age: 37
End: 2022-08-10
Payer: COMMERCIAL

## 2022-08-10 VITALS
BODY MASS INDEX: 50.02 KG/M2 | HEIGHT: 64 IN | RESPIRATION RATE: 17 BRPM | TEMPERATURE: 98.3 F | SYSTOLIC BLOOD PRESSURE: 109 MMHG | OXYGEN SATURATION: 90 % | WEIGHT: 293 LBS | DIASTOLIC BLOOD PRESSURE: 82 MMHG | HEART RATE: 95 BPM

## 2022-08-10 DIAGNOSIS — G43.909 MIGRAINE WITHOUT STATUS MIGRAINOSUS, NOT INTRACTABLE, UNSPECIFIED MIGRAINE TYPE: ICD-10-CM

## 2022-08-10 DIAGNOSIS — E55.9 VITAMIN D DEFICIENCY: ICD-10-CM

## 2022-08-10 DIAGNOSIS — G47.33 OSA (OBSTRUCTIVE SLEEP APNEA): ICD-10-CM

## 2022-08-10 DIAGNOSIS — E28.2 PCOS (POLYCYSTIC OVARIAN SYNDROME): Primary | ICD-10-CM

## 2022-08-10 DIAGNOSIS — N93.9 ABNORMAL UTERINE BLEEDING (AUB): ICD-10-CM

## 2022-08-10 PROCEDURE — 99214 OFFICE O/P EST MOD 30 MIN: CPT | Performed by: NURSE PRACTITIONER

## 2022-08-10 RX ORDER — OMEPRAZOLE 20 MG/1
20 CAPSULE, DELAYED RELEASE ORAL DAILY
Qty: 90 CAPSULE | Refills: 1 | Status: SHIPPED | OUTPATIENT
Start: 2022-08-10

## 2022-08-10 RX ORDER — LITHIUM CARBONATE 300 MG/1
CAPSULE ORAL
COMMUNITY
Start: 2022-08-08

## 2022-08-10 NOTE — PROGRESS NOTES
Rm: 07      Chief Complaint   Patient presents with    Follow-up     Dr. Ann Marie Atwood Pt       Visit Vitals  /82 (BP 1 Location: Left upper arm, BP Patient Position: Sitting, BP Cuff Size: Adult long)   Pulse 95   Temp 98.3 °F (36.8 °C) (Oral)   Resp 17   Ht 5' 4\" (1.626 m)   Wt 317 lb 6.4 oz (144 kg)   SpO2 90%   BMI 54.48 kg/m²       1. Have you been to the ER, urgent care clinic since your last visit? Hospitalized since your last visit? Yes Where: [facility]    2. Have you seen or consulted any other health care providers outside of the [address] [place] [address] since your last visit? Include any pap smears or colon screening.  Yes Where: [facility]

## 2022-08-10 NOTE — PROGRESS NOTES
Subjective  Gonzales Sandhu is a 29 y.o. female presents for follow up  HPI  Elin Plunkett gyn provider   Hx of PMS  Was on IAN for  a long time   Then Progestin only contraceptive, had more PMS symptoms   Nexplanon removed  in April   With acupuncture she  now has monthly cycles   PMS symptoms have decreased   Flu like symptoms during ovulation period   PCOS was seeing endocrinologist, but now managed by PCP d/t cost   Sinus congestion   On Prilosec for a while, GERD symptoms intermittent   Sees  psychiatrist for depression, anxiety   Medications effective     Past Medical History:   Diagnosis Date    Anxiety     Depression     Yannick Yarbrough    Elevated blood pressure 5/31/2011    Encounter for Nexplanon removal 04/01/2022    Hypertension     Irregular menses     Migraine 8/2/2011    Obesity     DENISE (obstructive sleep apnea)     cpap at 8    PCOS (polycystic ovarian syndrome)     endocrine Dr. Brady Morocho   Allergen Reactions    Topiramate Other (comments)     Suicidal thoughts  Other reaction(s): Other  Suicidal thoughts        History reviewed. No pertinent surgical history. Review of Systems   Constitutional: Negative. HENT: Negative. Respiratory: Negative. Cardiovascular: Negative. Gastrointestinal: Negative. Skin: Negative. Neurological: Negative. Psychiatric/Behavioral:  Positive for depression. The patient is nervous/anxious and has insomnia. Objective  Visit Vitals  /82 (BP 1 Location: Left upper arm, BP Patient Position: Sitting, BP Cuff Size: Adult long)   Pulse 95   Temp 98.3 °F (36.8 °C) (Oral)   Resp 17   Ht 5' 4\" (1.626 m)   Wt 317 lb 6.4 oz (144 kg)   SpO2 90%   BMI 54.48 kg/m²      Physical Exam  Constitutional:       General: She is not in acute distress. Appearance: Normal appearance. She is not ill-appearing. Neck:      Vascular: No carotid bruit. Cardiovascular:      Rate and Rhythm: Normal rate and regular rhythm.    Pulmonary: Effort: Pulmonary effort is normal.      Breath sounds: Normal breath sounds. Musculoskeletal:      Right lower leg: No edema. Left lower leg: No edema. Lymphadenopathy:      Cervical: No cervical adenopathy. Skin:     General: Skin is warm and dry. Neurological:      Mental Status: She is alert. Mental status is at baseline. Psychiatric:         Attention and Perception: Attention normal.         Mood and Affect: Affect is tearful. Flat affect: very tearful when advised of Dr Devon Jamison' death. Speech: Speech normal.         Behavior: Behavior normal.         Thought Content: Thought content normal.         Cognition and Memory: Cognition normal.        Assessment & Plan    ICD-10-CM ICD-9-CM    1. PCOS (polycystic ovarian syndrome)  H22.7 077.6 METABOLIC PANEL, COMPREHENSIVE      HEMOGLOBIN A1C WITH EAG      HEMOGLOBIN A1C WITH EAG      METABOLIC PANEL, COMPREHENSIVE      2. Vitamin D deficiency  E55.9 268.9       3. Migraine without status migrainosus, not intractable, unspecified migraine type  U02.094 066.40 METABOLIC PANEL, COMPREHENSIVE      METABOLIC PANEL, COMPREHENSIVE      4. DENISE (obstructive sleep apnea)  G47.33 327.23       5. Abnormal uterine bleeding (AUB)  N93.9 626.9 TSH 3RD GENERATION      T4, FREE      CBC WITH AUTOMATED DIFF      CBC WITH AUTOMATED DIFF      T4, FREE      TSH 3RD GENERATION        Diagnoses and all orders for this visit:    1. PCOS (polycystic ovarian syndrome)  -     METABOLIC PANEL, COMPREHENSIVE; Future  -     HEMOGLOBIN A1C WITH EAG; Future    2. Vitamin D deficiency    3. Migraine without status migrainosus, not intractable, unspecified migraine type  -     METABOLIC PANEL, COMPREHENSIVE; Future    4. DENISE (obstructive sleep apnea)    5. Abnormal uterine bleeding (AUB)  -     TSH 3RD GENERATION; Future  -     T4, FREE; Future  -     CBC WITH AUTOMATED DIFF; Future    Other orders  -     omeprazole (PRILOSEC) 20 mg capsule;  Take 1 Capsule by mouth in the morning. Follow-up and Dispositions    Return in about 3 months (around 11/10/2022) for PCOS, headache, blood pressure.        lab results and schedule of future lab studies reviewed with patient  reviewed diet, exercise and weight control  reviewed medications and side effects in detail  Rylie Lockwood, DIO

## 2022-08-11 LAB
ALBUMIN SERPL-MCNC: 4 G/DL (ref 3.5–5)
ALBUMIN/GLOB SERPL: 1.1 {RATIO} (ref 1.1–2.2)
ALP SERPL-CCNC: 66 U/L (ref 45–117)
ALT SERPL-CCNC: 31 U/L (ref 12–78)
ANION GAP SERPL CALC-SCNC: 7 MMOL/L (ref 5–15)
AST SERPL-CCNC: 16 U/L (ref 15–37)
BASOPHILS # BLD: 0.1 K/UL (ref 0–0.1)
BASOPHILS NFR BLD: 1 % (ref 0–1)
BILIRUB SERPL-MCNC: 0.4 MG/DL (ref 0.2–1)
BUN SERPL-MCNC: 14 MG/DL (ref 6–20)
BUN/CREAT SERPL: 17 (ref 12–20)
CALCIUM SERPL-MCNC: 9.6 MG/DL (ref 8.5–10.1)
CHLORIDE SERPL-SCNC: 106 MMOL/L (ref 97–108)
CO2 SERPL-SCNC: 25 MMOL/L (ref 21–32)
CREAT SERPL-MCNC: 0.83 MG/DL (ref 0.55–1.02)
DIFFERENTIAL METHOD BLD: NORMAL
EOSINOPHIL # BLD: 0.1 K/UL (ref 0–0.4)
EOSINOPHIL NFR BLD: 1 % (ref 0–7)
ERYTHROCYTE [DISTWIDTH] IN BLOOD BY AUTOMATED COUNT: 12.9 % (ref 11.5–14.5)
EST. AVERAGE GLUCOSE BLD GHB EST-MCNC: 108 MG/DL
GLOBULIN SER CALC-MCNC: 3.5 G/DL (ref 2–4)
GLUCOSE SERPL-MCNC: 119 MG/DL (ref 65–100)
HBA1C MFR BLD: 5.4 % (ref 4–5.6)
HCT VFR BLD AUTO: 43.5 % (ref 35–47)
HGB BLD-MCNC: 13.9 G/DL (ref 11.5–16)
IMM GRANULOCYTES # BLD AUTO: 0 K/UL (ref 0–0.04)
IMM GRANULOCYTES NFR BLD AUTO: 0 % (ref 0–0.5)
LYMPHOCYTES # BLD: 2.7 K/UL (ref 0.8–3.5)
LYMPHOCYTES NFR BLD: 28 % (ref 12–49)
MCH RBC QN AUTO: 29.3 PG (ref 26–34)
MCHC RBC AUTO-ENTMCNC: 32 G/DL (ref 30–36.5)
MCV RBC AUTO: 91.6 FL (ref 80–99)
MONOCYTES # BLD: 0.6 K/UL (ref 0–1)
MONOCYTES NFR BLD: 6 % (ref 5–13)
NEUTS SEG # BLD: 6.4 K/UL (ref 1.8–8)
NEUTS SEG NFR BLD: 64 % (ref 32–75)
NRBC # BLD: 0 K/UL (ref 0–0.01)
NRBC BLD-RTO: 0 PER 100 WBC
PLATELET # BLD AUTO: 240 K/UL (ref 150–400)
PMV BLD AUTO: 11.5 FL (ref 8.9–12.9)
POTASSIUM SERPL-SCNC: 4.1 MMOL/L (ref 3.5–5.1)
PROT SERPL-MCNC: 7.5 G/DL (ref 6.4–8.2)
RBC # BLD AUTO: 4.75 M/UL (ref 3.8–5.2)
SODIUM SERPL-SCNC: 138 MMOL/L (ref 136–145)
T4 FREE SERPL-MCNC: 0.9 NG/DL (ref 0.8–1.5)
TSH SERPL DL<=0.05 MIU/L-ACNC: 2.57 UIU/ML (ref 0.36–3.74)
WBC # BLD AUTO: 9.9 K/UL (ref 3.6–11)

## 2022-08-18 ENCOUNTER — PATIENT MESSAGE (OUTPATIENT)
Dept: INTERNAL MEDICINE CLINIC | Age: 37
End: 2022-08-18

## 2022-08-18 DIAGNOSIS — J01.90 SUBACUTE SINUSITIS, UNSPECIFIED LOCATION: Primary | ICD-10-CM

## 2022-08-18 DIAGNOSIS — J32.9 CHRONIC SINUSITIS, UNSPECIFIED LOCATION: Primary | ICD-10-CM

## 2022-08-18 RX ORDER — METHYLPREDNISOLONE 4 MG/1
TABLET ORAL
Qty: 1 DOSE PACK | Refills: 0 | Status: SHIPPED | OUTPATIENT
Start: 2022-08-18 | End: 2022-10-17

## 2022-08-18 RX ORDER — CEFDINIR 300 MG/1
300 CAPSULE ORAL 2 TIMES DAILY
Qty: 20 CAPSULE | Refills: 0 | Status: SHIPPED | OUTPATIENT
Start: 2022-08-18 | End: 2022-08-28

## 2022-09-07 RX ORDER — METHYLPREDNISOLONE 4 MG/1
TABLET ORAL
Qty: 1 DOSE PACK | Refills: 0 | Status: SHIPPED | OUTPATIENT
Start: 2022-09-07 | End: 2022-10-17

## 2022-09-07 NOTE — TELEPHONE ENCOUNTER
From: Bharat Byrne  To: Anne Lazar NP  Sent: 8/18/2022 10:34 AM EDT  Subject: Sinuses    Hi Ms. Maris Parekh. I mentioned my consistent sinus pain and pressure at our appointment. I've tried several days of Flonase but have not gotten much relief. I've used my neti-pot a handful of times and it's very temporary relief. I've also been using some [place] herbs my acupuncturist gave me (not at the same time as Flonase). I still have a lot of pain, particularly in the cheeks and right eyebrow. Do you think this might be a long-standing sinus infection? I'm prone to bacterial sinus infections because things never drain. Should I try anything else or something for longer? Or do you think antibiotics are needed? Thanks.

## 2022-10-17 ENCOUNTER — OFFICE VISIT (OUTPATIENT)
Dept: OBGYN CLINIC | Age: 37
End: 2022-10-17
Payer: COMMERCIAL

## 2022-10-17 VITALS — BODY MASS INDEX: 55.27 KG/M2 | WEIGHT: 293 LBS | DIASTOLIC BLOOD PRESSURE: 88 MMHG | SYSTOLIC BLOOD PRESSURE: 126 MMHG

## 2022-10-17 DIAGNOSIS — Z01.411 ENCOUNTER FOR GYNECOLOGICAL EXAMINATION WITH ABNORMAL FINDING: Primary | ICD-10-CM

## 2022-10-17 DIAGNOSIS — R10.2 PELVIC PAIN IN FEMALE: ICD-10-CM

## 2022-10-17 PROCEDURE — 99395 PREV VISIT EST AGE 18-39: CPT | Performed by: OBSTETRICS & GYNECOLOGY

## 2022-10-17 NOTE — PROGRESS NOTES
Problem Visit    Luigi Sanchez is a 40 y.o. with pmh of PCOS (diagnosed clinically by anovulatory cycles and polycystic appearance to ovaries) presenting for annual exam.     Pt with long h/o nausea, chronic low back pain, and anxiety/depression associated with menses. Wonders if she could have endometriosis. Pt has tried POPs without improvement in symptoms, and Nexplanon had negative impacts on her mood (removed 4/2022). Currently not on any hormonal contraception for regulation of menses. She has been doing accupuncture, and actually reports menses have been monthly with decreased symptoms. She is overall feeling better. She has also followed with chiropractor for adjustments as she has had chronic low back pain for years. She has also seen orthopedic spine doctors, had MRI showing some degenerative changes. She has done PT and steroid injection of SI joint, with some relief of back pain. Estrogen CI d/t multiple other comorbidities including HTN, migraines, etc. Pt is obese. She has been on metformin for management of insulin resistance and PCOS. She suffers from significant anxiety, depression and follows closely with psychiatry (who did not think trial of Alfred Drop was a good idea for her mental health). She has also been worked up by neurologist (had some \"abnormal brain lesion\"), but reports MS was ruled out. Also with occasional GERD, and diarrhea assoc with metformin. Never sexually active. Declines speculum exam today. PCP - Dr. Migue Huggins passed away - pt very saddened by this as she had a lot of trust in him for her medical care, tearful today    Previously followed with Erma Paget, NP at [facility]. TV ULTRASOUND 6/15/21  THE UTERUS IS ANTEVERTED, NORMAL IN SIZE AND ECHOGENICITY. THE ENDOMETRIUM MEASURES 9.0MM IN THICKNESS. NO MASSES OR ABNORMALITIES ARE SEEN. RIGHT OVARY APPEARS ENLARGED WITH MULTIPLE FOLLICLES SEEN ON THE PERIPHERY. LEFT OVARY APPEARS WNL.   NO FREE FLUID IS SEEN IN THE CDS. Ob/Gyn Hx:  G0   LMP-10/13/22  Menarche- 13  Menses-regular since doing accupuncture, have been irregular in the past  Contraception-denies  STI- denies  ? SA-never    Health maintenance:  Pap-6/15/21 NILM HPV Neg  Gardasil-denies    Past Medical History:   Diagnosis Date    Anxiety     Depression     Geoff Farfan    Elevated blood pressure 5/31/2011    Encounter for Nexplanon removal 04/01/2022    Hypertension     Irregular menses     Migraine 8/2/2011    Obesity     DENISE (obstructive sleep apnea)     cpap at 8    PCOS (polycystic ovarian syndrome)     endocrine Dr. Gurpreet Pineda       No past surgical history on file.       Family History   Problem Relation Age of Onset    Diabetes Mother     Other Mother         obesity    OSTEOARTHRITIS Mother     Diabetes Father     Hypertension Father     Other Father         obesity    Prostate Cancer Father     Heart Disease Maternal Grandmother     Stroke Maternal Grandfather     Other Brother         obesity       Social History     Socioeconomic History    Marital status: SINGLE     Spouse name: Not on file    Number of children: Not on file    Years of education: Not on file    Highest education level: Not on file   Occupational History    Not on file   Tobacco Use    Smoking status: Never    Smokeless tobacco: Never   Vaping Use    Vaping Use: Never used   Substance and Sexual Activity    Alcohol use: Yes     Comment: rare    Drug use: No    Sexual activity: Never   Other Topics Concern    Not on file   Social History Narrative    Not on file     Social Determinants of Health     Financial Resource Strain: Not on file   Food Insecurity: Not on file   Transportation Needs: Not on file   Physical Activity: Not on file   Stress: Not on file   Social Connections: Not on file   Intimate Partner Violence: Not on file   Housing Stability: Not on file       Current Outpatient Medications   Medication Sig Dispense Refill    methylPREDNISolone (MEDROL DOSEPACK) 4 mg tablet Use per dose pack 1 Dose Pack 0    methylPREDNISolone (MEDROL DOSEPACK) 4 mg tablet Use per dose pack 1 Dose Pack 0    lithium carbonate 300 mg capsule       omeprazole (PRILOSEC) 20 mg capsule Take 1 Capsule by mouth in the morning. 90 Capsule 1    clindamycin (CLINDAGEL) 1 % topical gel Apply  to affected area two (2) times a day. use thin film on affected area 60 g 5    metFORMIN ER (GLUCOPHAGE XR) 500 mg tablet TAKE 4 TABLETS DAILY WITH DINNER 360 Tablet 3    magnesium oxide,aspartate,citr 400 mg magnesium cap Take  by mouth. (Patient not taking: Reported on 8/10/2022)      sertraline (ZOLOFT) 100 mg tablet 250 mg.      lithium carbonate 300 mg tablet Take 150 mg by mouth daily. polyethylene glycol (MIRALAX) 17 gram/dose powder Take 17 g by mouth daily. clean out - 7 capfuls of miralax in a 32 oz gatorade - drink over a few hours. May need to repeat x 2-3 days depending on results. 527 g 5    hydrocortisone (HYTONE) 2.5 % topical cream APPLY AS NEEDED FOR FLARES ON CHEST      metroNIDAZOLE (METROCREAM) 0.75 % topical cream       triamcinolone acetonide (KENALOG) 0.1 % topical cream Apply  to affected area two (2) times a day. use thin layer x 3-5 days 30 g 1    ketoconazole (NIZORAL) 2 % topical cream Apply  to affected area two (2) times a day. 30 g 1    LORazepam (ATIVAN) 0.5 mg tablet Take 0.5 mg by mouth. Take one half   Indications: patient reports takes 1-2 tablets up to 3 times daily as needed         Allergies   Allergen Reactions    Topiramate Other (comments)     Suicidal thoughts  Other reaction(s):  Other  Suicidal thoughts       Review of Systems - History obtained from the patient  Constitutional: negative for weight loss, fever, night sweat  HEENT: negative for hearing loss, earache, congestion, snoring, sorethroat  CV: negative for chest pain, palpitations, edema  Resp: negative for cough, shortness of breath, wheezing  GI: negative for change in bowel habits, abdominal pain, black or bloody stools  : negative for frequency, dysuria, hematuria, vaginal discharge  MSK: negative for back pain, joint pain, muscle pain  Breast: negative for breast lumps, nipple discharge, galactorrhea  Skin :negative for itching, rash, hives  Neuro: negative for dizziness, headache, confusion, weakness  Psych: negative for anxiety, depression, change in mood  Heme/lymph: negative for bleeding, bruising, pallor    Physical Exam  Visit Vitals  /88   Wt 322 lb (146.1 kg)   LMP 10/13/2022   BMI 55.27 kg/m²       PHYSICAL EXAMINATION    Constitutional  Appearance: well-nourished, well developed, alert, in no acute distress, +morbidly obese    HENT  Head and Face: appears normal    Neck  Inspection/Palpation: normal appearance, no masses or tenderness  Lymph Nodes: no lymphadenopathy present  Thyroid: gland size normal, nontender, no nodules or masses present on palpation    Chest  Respiratory Effort: breathing non-labored  Auscultation: normal breath sounds    Cardiovascular  Heart:   Auscultation: regular rate and rhythm without murmur    Breasts  Inspection of Breasts: breasts symmetrical, no skin changes, no discharge present, nipple appearance normal, no skin retraction present  Palpation of Breasts and Axillae: no masses present on palpation, no breast tenderness  Axillary Lymph Nodes: no lymphadenopathy present    Gastrointestinal  Abdominal Examination: abdomen non-tender to palpation, normal bowel sounds, no masses present, +obesity limiting assessment  Liver and spleen: no hepatomegaly present, spleen not palpable  Hernias: no hernias identified    Genitourinary   External Genitalia: normal appearance for age, no discharge present, no tenderness present, no inflammatory lesions present, no masses present, no atrophy present  Bladder: non-tender to palpation  Urethra: appears normal  Uterus: normal size, shape and consistency  Adnexa: no adnexal tenderness present, no adnexal masses present, but bimanual exam limited by habitus  Perineum: perineum within normal limits, no evidence of trauma, no rashes or skin lesions present  Inguinal Lymph Nodes: no lymphadenopathy present    Skin  General Inspection: no rash, no lesions identified    Neurologic/Psychiatric  Mental Status:  Orientation: grossly oriented to person, place and time  Mood and Affect: mood normal, affect appropriate    Assessment/Plan:  40 y.o. G0 with PCOS presenting for AE. Health maintenance:  -diet/exercise/healthy lifestyle/weight loss  -pap/HPV next due 2024  -STI screen declined as not SA  -gardasil series recommended  -not interested in hormonal regulation of menses at this time, given cycles occurring nearly monthly, can safely hold on progestin therapy for endometrial protection at this time.  may continue accupuncture  -menstrual calendar, pain diary    RTC 1 year for AE or sooner jose c Ramirez MD  10/17/2022  1:46 PM

## 2022-11-18 ENCOUNTER — OFFICE VISIT (OUTPATIENT)
Dept: PRIMARY CARE CLINIC | Age: 37
End: 2022-11-18
Payer: COMMERCIAL

## 2022-11-18 VITALS
OXYGEN SATURATION: 97 % | RESPIRATION RATE: 16 BRPM | WEIGHT: 293 LBS | DIASTOLIC BLOOD PRESSURE: 84 MMHG | HEIGHT: 64 IN | HEART RATE: 90 BPM | TEMPERATURE: 98.9 F | BODY MASS INDEX: 50.02 KG/M2 | SYSTOLIC BLOOD PRESSURE: 133 MMHG

## 2022-11-18 DIAGNOSIS — G43.109 CHRONIC MIGRAINE WITH AURA: ICD-10-CM

## 2022-11-18 DIAGNOSIS — K21.9 GASTROESOPHAGEAL REFLUX DISEASE, UNSPECIFIED WHETHER ESOPHAGITIS PRESENT: ICD-10-CM

## 2022-11-18 DIAGNOSIS — F39 MOOD DISORDER (HCC): ICD-10-CM

## 2022-11-18 DIAGNOSIS — N92.6 IRREGULAR MENSES: ICD-10-CM

## 2022-11-18 DIAGNOSIS — E78.00 PURE HYPERCHOLESTEROLEMIA: ICD-10-CM

## 2022-11-18 DIAGNOSIS — E55.9 VITAMIN D DEFICIENCY: Primary | ICD-10-CM

## 2022-11-18 DIAGNOSIS — E28.2 PCOS (POLYCYSTIC OVARIAN SYNDROME): ICD-10-CM

## 2022-11-18 DIAGNOSIS — F32.1 CURRENT MODERATE EPISODE OF MAJOR DEPRESSIVE DISORDER, UNSPECIFIED WHETHER RECURRENT (HCC): ICD-10-CM

## 2022-11-18 DIAGNOSIS — F41.9 ANXIETY: ICD-10-CM

## 2022-11-18 PROCEDURE — 99204 OFFICE O/P NEW MOD 45 MIN: CPT | Performed by: FAMILY MEDICINE

## 2022-11-18 NOTE — PROGRESS NOTES
Rm    Chief Complaint   Patient presents with    New Patient     Establishing care        Visit Vitals  /84 (BP 1 Location: Left upper arm, BP Patient Position: Sitting, BP Cuff Size: Large adult)   Pulse 90   Temp 98.9 °F (37.2 °C) (Oral)   Resp 16   Ht 5' 4\" (1.626 m)   Wt 321 lb 12.8 oz (146 kg)   SpO2 97%   BMI 55.24 kg/m²        1. Have you been to the ER, urgent care clinic since your last visit? Hospitalized since your last visit? No    2. Have you seen or consulted any other health care providers outside of the [address] [place] [address] since your last visit? Include any pap smears or colon screening. No     Health Maintenance Due   Topic Date Due    COVID-19 Vaccine (4 - Booster for Pfizer series) 12/23/2021    Flu Vaccine (1) 08/01/2022        3 most recent PHQ Screens 11/18/2022   PHQ Not Done -   Little interest or pleasure in doing things Not at all   Feeling down, depressed, irritable, or hopeless Not at all   Total Score PHQ 2 0   Trouble falling or staying asleep, or sleeping too much -   Feeling tired or having little energy -   Poor appetite, weight loss, or overeating -   Feeling bad about yourself - or that you are a failure or have let yourself or your family down -   Trouble concentrating on things such as school, work, reading, or watching TV -   Moving or speaking so slowly that other people could have noticed; or the opposite being so fidgety that others notice -   Thoughts of being better off dead, or hurting yourself in some way -   PHQ 9 Score -   How difficult have these problems made it for you to do your work, take care of your home and get along with others -        No flowsheet data found.     Learning Assessment 10/24/2017   PRIMARY LEARNER Patient   BARRIERS PRIMARY LEARNER NONE   PRIMARY LANGUAGE ENGLISH   LEARNER PREFERENCE PRIMARY DEMONSTRATION   ANSWERED BY patient   RELATIONSHIP SELF

## 2022-11-18 NOTE — PROGRESS NOTES
HPI     Chief Complaint   Patient presents with    New Patient     Establishing care     She is a 40 y.o. female who presents for establishing care. PCOS - She is currently on Metformin 1000mg qhs. No hx of diabetes. Strong fam hx of diabetes. States periods can be irregular with her PCOS. She has never been sexually active/ denies any chance of pregnancy. States she has seen multiple Gynecologist to be evaluated for possible endometriosis. Cannot take combined OCPs due to her migraines/ chronic medical issues. Was previously on progesterone only pill but stopped. States she has been doing acupuncture which has been very helpful. Mood Disorder/ Anxiety (health related)/ Depression - She is followed by Psychiatry Dr. Camilla Paulson. States her anxiety is the worst issue. Currently on Lithium, Zoloft, Ativan. Feels relatively stable on regimen. She was a previous patient of Dr. Kaylen Harrell (recently passed away) and is very tearful during the visit today. Denies SI/ HI.      GERD - She is on Omeprazole. Migraine with Aura - Followed by Radha Davila at [facility]. She has migraines since mid 20s typically related to menses. States they are becoming more rare. She takes Excedrin migraine for her migraines. Also takes Maxalt and has not had a migraine yet to try the medication. Has visual auras since 2019. Last episode had face and hand tingling which was very worrying to her and she went to the ER. She had an MRI in 2012 which showed white matter disease. Was told it could be possible MS and then she followed up with Dr. Bertha Flores shortly after. She had another MRI in 2019 which showed more white matter disease. States she googled it and saw a lot of non specific reasons for it. She had MRIs of her spine and had a spinal tap which was all normal. Dr. Bertha Flores felt very confident it was not MS. States her neurological symptoms are what worry her most.     Obesity - BMI >50. She states she is interested in weight loss management. She is not interested in bariatric surgery. States she is not great about taking her medication regularly. Review of Systems   Gastrointestinal:  Negative for abdominal pain and blood in stool. Genitourinary:  Positive for menstrual problem. Neurological:  Positive for headaches. Psychiatric/Behavioral:  Positive for dysphoric mood. Negative for suicidal ideas. The patient is nervous/anxious. Reviewed PmHx, RxHx, FmHx, SocHx, AllgHx and updated and dated in the chart. Physical Exam:  Visit Vitals  /84 (BP 1 Location: Left upper arm, BP Patient Position: Sitting, BP Cuff Size: Large adult)   Pulse 90   Temp 98.9 °F (37.2 °C) (Oral)   Resp 16   Ht 5' 4\" (1.626 m)   Wt 321 lb 12.8 oz (146 kg)   SpO2 97%   BMI 55.24 kg/m²     Physical Exam  Vitals and nursing note reviewed. Constitutional:       General: She is not in acute distress. Appearance: Normal appearance. She is not ill-appearing. Cardiovascular:      Rate and Rhythm: Normal rate and regular rhythm. Heart sounds: No murmur heard. Pulmonary:      Effort: Pulmonary effort is normal. No respiratory distress. Breath sounds: Normal breath sounds. Neurological:      General: No focal deficit present. Mental Status: She is alert. Psychiatric:         Mood and Affect: Mood normal.         Behavior: Behavior normal.     No results found for this or any previous visit (from the past 12 hour(s)). Assessment / Plan     Diagnoses and all orders for this visit:    1. Vitamin D deficiency  -     VITAMIN D, 25 HYDROXY; Future    2. Pure hypercholesterolemia  -     LIPID PANEL; Future    3. PCOS (polycystic ovarian syndrome) - Followed by GYN.   -     HEMOGLOBIN A1C WITH EAG; Future    4. BMI 50.0-59.9, adult [facility]) - Discussed I would like to check labs and then bring her back to fully discuss options. We briefly discussed different treatment options today as well. -     THYROID CASCADE PROFILE;  Future  - METABOLIC PANEL, COMPREHENSIVE; Future  -     CBC W/O DIFF; Future    5. Irregular menses - Followed by GYN. 6. Chronic migraine with aura - Followed by Neuro. 7. Gastroesophageal reflux disease, unspecified whether esophagitis present    8. Mood disorder ([facility] - Followed by Psych. 9. Current moderate episode of major depressive disorder, unspecified whether recurrent ([facility] - Followed by Psych. 10. Anxiety - Followed by Pscy. I have discussed the diagnosis with the patient and the intended plan as seen in the above orders. The patient has received an after-visit summary and questions were answered concerning future plans. I have discussed medication side effects and warnings with the patient as well.     Torres De Los Santos, DO

## 2022-11-25 LAB
25(OH)D3 SERPL-MCNC: 19.8 NG/ML (ref 30–100)
ALBUMIN SERPL-MCNC: 4 G/DL (ref 3.5–5)
ALBUMIN/GLOB SERPL: 1.1 {RATIO} (ref 1.1–2.2)
ALP SERPL-CCNC: 61 U/L (ref 45–117)
ALT SERPL-CCNC: 24 U/L (ref 12–78)
ANION GAP SERPL CALC-SCNC: 6 MMOL/L (ref 5–15)
AST SERPL-CCNC: 12 U/L (ref 15–37)
BILIRUB SERPL-MCNC: 0.8 MG/DL (ref 0.2–1)
BUN SERPL-MCNC: 13 MG/DL (ref 6–20)
BUN/CREAT SERPL: 14 (ref 12–20)
CALCIUM SERPL-MCNC: 9.3 MG/DL (ref 8.5–10.1)
CHLORIDE SERPL-SCNC: 106 MMOL/L (ref 97–108)
CHOLEST SERPL-MCNC: 183 MG/DL
CO2 SERPL-SCNC: 28 MMOL/L (ref 21–32)
CREAT SERPL-MCNC: 0.94 MG/DL (ref 0.55–1.02)
ERYTHROCYTE [DISTWIDTH] IN BLOOD BY AUTOMATED COUNT: 13.2 % (ref 11.5–14.5)
EST. AVERAGE GLUCOSE BLD GHB EST-MCNC: 105 MG/DL
GLOBULIN SER CALC-MCNC: 3.6 G/DL (ref 2–4)
GLUCOSE SERPL-MCNC: 94 MG/DL (ref 65–100)
HBA1C MFR BLD: 5.3 % (ref 4–5.6)
HCT VFR BLD AUTO: 45.2 % (ref 35–47)
HDLC SERPL-MCNC: 43 MG/DL
HDLC SERPL: 4.3 {RATIO} (ref 0–5)
HGB BLD-MCNC: 13.8 G/DL (ref 11.5–16)
LDLC SERPL CALC-MCNC: 115.2 MG/DL (ref 0–100)
MCH RBC QN AUTO: 28.4 PG (ref 26–34)
MCHC RBC AUTO-ENTMCNC: 30.5 G/DL (ref 30–36.5)
MCV RBC AUTO: 93 FL (ref 80–99)
NRBC # BLD: 0 K/UL (ref 0–0.01)
NRBC BLD-RTO: 0 PER 100 WBC
PLATELET # BLD AUTO: 217 K/UL (ref 150–400)
PMV BLD AUTO: 11.8 FL (ref 8.9–12.9)
POTASSIUM SERPL-SCNC: 4.5 MMOL/L (ref 3.5–5.1)
PROT SERPL-MCNC: 7.6 G/DL (ref 6.4–8.2)
RBC # BLD AUTO: 4.86 M/UL (ref 3.8–5.2)
SODIUM SERPL-SCNC: 140 MMOL/L (ref 136–145)
TRIGL SERPL-MCNC: 124 MG/DL (ref ?–150)
VLDLC SERPL CALC-MCNC: 24.8 MG/DL
WBC # BLD AUTO: 7.1 K/UL (ref 3.6–11)

## 2022-11-26 LAB — TSH SERPL-ACNC: 2.6 UIU/ML (ref 0.45–4.5)

## 2022-12-12 ENCOUNTER — OFFICE VISIT (OUTPATIENT)
Dept: PRIMARY CARE CLINIC | Age: 37
End: 2022-12-12
Payer: COMMERCIAL

## 2022-12-12 VITALS
SYSTOLIC BLOOD PRESSURE: 118 MMHG | DIASTOLIC BLOOD PRESSURE: 82 MMHG | WEIGHT: 293 LBS | TEMPERATURE: 97.8 F | HEART RATE: 95 BPM | HEIGHT: 64 IN | OXYGEN SATURATION: 98 % | BODY MASS INDEX: 50.02 KG/M2 | RESPIRATION RATE: 17 BRPM

## 2022-12-12 DIAGNOSIS — R35.0 URINARY FREQUENCY: ICD-10-CM

## 2022-12-12 DIAGNOSIS — N92.6 MENSTRUAL PROBLEM: Primary | ICD-10-CM

## 2022-12-12 DIAGNOSIS — E55.9 VITAMIN D DEFICIENCY: ICD-10-CM

## 2022-12-12 DIAGNOSIS — E66.01 OBESITY, MORBID, BMI 50 OR HIGHER (HCC): ICD-10-CM

## 2022-12-12 PROCEDURE — 99213 OFFICE O/P EST LOW 20 MIN: CPT | Performed by: FAMILY MEDICINE

## 2022-12-12 NOTE — PROGRESS NOTES
Chief Complaint   Patient presents with    Follow-up    Labs      Identified pt with two pt identifiers(name and ). Chief Complaint   Patient presents with    Follow-up    Labs        3 most recent PHQ Screens 2022   PHQ Not Done -   Little interest or pleasure in doing things Not at all   Feeling down, depressed, irritable, or hopeless Several days   Total Score PHQ 2 1   Trouble falling or staying asleep, or sleeping too much Not at all   Feeling tired or having little energy Several days   Poor appetite, weight loss, or overeating Several days   Feeling bad about yourself - or that you are a failure or have let yourself or your family down -   Trouble concentrating on things such as school, work, reading, or watching TV Not at all   Moving or speaking so slowly that other people could have noticed; or the opposite being so fidgety that others notice Not at all   Thoughts of being better off dead, or hurting yourself in some way Not at all   PHQ 9 Score -   How difficult have these problems made it for you to do your work, take care of your home and get along with others -        There were no vitals filed for this visit. Health Maintenance Due   Topic Date Due    COVID-19 Vaccine (4 - Booster for Pfizer series) 2021        1. Have you been to the ER, urgent care clinic since your last visit? Hospitalized since your last visit? No    2. Have you seen or consulted any other health care providers outside of the [address] [place] [address] since your last visit? Include any pap smears or colon screening. No    5. For patients aged 21-65: Has the patient had a pap smear?  Yes - no Care Gap present

## 2022-12-12 NOTE — PROGRESS NOTES
HPI     Chief Complaint   Patient presents with    Follow-up    Labs     She is a 40 y.o. female who presents for follow up. She is followed Mary Beth Parekh at [address] [place] [address] who is a RD. She has been taking the Vit D3 2000 units daily OTC since labs came back. She has seen multiple GYN doctors before. She saw  NP at [facility] and asked about endometriosis. She then saw Radha Broussard at [facility] who told her she didn't have symptoms of endometriosis. She then went to see another OB GYN outside of [facility] who also didn't think she had \"textbook symptoms\" of endometriosis. She was recommended to see Dr. Lana Salter who thought she might have endometriosis. She states multiple times throughout the month she gets extreme fatigue, low grade nausea, low back pain. States it is rare she has abdominal cramps but typically if she does it is right before her menses. She does not have pain with defecation. States within last year she did not have a period for several months. She had talked to GYN about Arvel Narvaez before but was not interested because of possible side effects. She cannot take combined OCPs because of her migraine history. She states around her menses she has to urinate frequently but little comes out. She has been checked for UTIs during this time and it always comes back negative for infections. She has not seen Urogyn. She is not having urinary symptoms at this time and does not think a UA is needed. She sees Colorectal for anal fissure. She was given Diltizam cream and told to do Sitz baths which does help. She is not sexually active and denies any chance of pregnancy. Review of Systems   Constitutional:  Negative for unexpected weight change. Genitourinary:  Positive for difficulty urinating, frequency and menstrual problem. Reviewed PmHx, RxHx, FmHx, SocHx, AllgHx and updated and dated in the chart.     Physical Exam:  Visit Vitals  /82 (BP 1 Location: Right upper arm, BP Patient Position: Sitting, BP Cuff Size: Large adult)   Pulse 95   Temp 97.8 °F (36.6 °C) (Temporal)   Resp 17   Ht 5' 4\" (1.626 m)   Wt 322 lb (146.1 kg)   SpO2 98%   BMI 55.27 kg/m²     Physical Exam  Vitals and nursing note reviewed. Constitutional:       General: She is not in acute distress. Appearance: Normal appearance. She is not ill-appearing. Cardiovascular:      Rate and Rhythm: Normal rate and regular rhythm. Heart sounds: No murmur heard. Pulmonary:      Effort: Pulmonary effort is normal. No respiratory distress. Breath sounds: Normal breath sounds. Neurological:      General: No focal deficit present. Mental Status: She is alert. Psychiatric:         Mood and Affect: Mood normal.         Behavior: Behavior normal.     No results found for this or any previous visit (from the past 12 hour(s)). Assessment / Plan     Diagnoses and all orders for this visit:    1. Menstrual problem  -     REFERRAL TO UROLOGY    2. Urinary frequency  -     REFERRAL TO UROLOGY    3. Vitamin D deficiency  -     VITAMIN D, 25 HYDROXY; Future    4. Obesity, morbid, BMI 50 or higher ([facility])     Check Vit D in 8 weeks. Referred to Urogyn for urinary/ gynecological issues. Patient has seen multiple gynecologists before. She did have a transvaginal US in 2021 that showed an enlarged R ovary. She cannot take combined OCPs. She has not seen Urology before for her urinary issues. Will refer to urogyn for their input. She declined urinary testing today. Discussed weight loss options at length including medication management options Phentermine, Contrave, Saxenda, weight loss clinic referral, bariatric surgery. Thinks she will do [facility] program once it opens. I have discussed the diagnosis with the patient and the intended plan as seen in the above orders. The patient has received an after-visit summary and questions were answered concerning future plans.   I have discussed medication side effects and warnings with the patient as well.     Rowdy Munoz, DO

## 2022-12-27 ENCOUNTER — PATIENT MESSAGE (OUTPATIENT)
Dept: PRIMARY CARE CLINIC | Age: 37
End: 2022-12-27

## 2022-12-28 ENCOUNTER — NURSE TRIAGE (OUTPATIENT)
Dept: OTHER | Facility: CLINIC | Age: 37
End: 2022-12-28

## 2022-12-28 NOTE — TELEPHONE ENCOUNTER
Location of patient: [facility] call from [place] [facility] with [employer]. Subjective: Caller states \"covid positive yesterday, fatigue, cough, fever, and congestion\"     Current Symptoms: see above    Onset: 2 days ago; worsening    Associated Symptoms: reduced activity, reduced appetite    Pain Severity: 4/10; sharp; she feels pain in her chest when she coughs    Temperature: 100 orally    What has been tried: nothing    LMP:  November 27th  Pregnant: No    Recommended disposition:  Discuss with PCP and callback by nurse within 1 hour. Writer called [facility]. Care advice provided, patient verbalizes understanding; denies any other questions or concerns; instructed to call back for any new or worsening symptoms. Writer provided warm transfer to [facility] at [employer] for further assistance    Attention Provider: Thank you for allowing me to participate in the care of your patient. The patient was connected to triage in response to information provided to the [facility]. Please do not respond through this encounter as the response is not directed to a shared pool.     Reason for Disposition   [1] HIGH RISK for severe COVID complications (e.g., weak immune system, age > 59 years, obesity with BMI of 30 or higher, pregnant, chronic lung disease or other chronic medical condition) AND [2] COVID symptoms (e.g., cough, fever)  (Exceptions: Already seen by PCP and no new or worsening symptoms.)    Protocols used: Coronavirus (COVID-19) Diagnosed or Suspected-ADULT-[place]
hard copy, drawn during this pregnancy

## 2022-12-29 NOTE — TELEPHONE ENCOUNTER
Sent patient a message regarding her concerns about her symptoms. Told patient that symptoms typically last 5-7 days but if they come too severe, to go to an urgent care to get evaluated.

## 2023-03-21 ENCOUNTER — OFFICE VISIT (OUTPATIENT)
Dept: OBGYN CLINIC | Age: 38
End: 2023-03-21
Payer: COMMERCIAL

## 2023-03-21 VITALS
HEIGHT: 64 IN | SYSTOLIC BLOOD PRESSURE: 130 MMHG | WEIGHT: 240 LBS | BODY MASS INDEX: 40.97 KG/M2 | DIASTOLIC BLOOD PRESSURE: 84 MMHG

## 2023-03-21 DIAGNOSIS — B37.2 CUTANEOUS CANDIDIASIS: Primary | ICD-10-CM

## 2023-03-21 PROCEDURE — 99213 OFFICE O/P EST LOW 20 MIN: CPT | Performed by: OBSTETRICS & GYNECOLOGY

## 2023-03-21 RX ORDER — NYSTATIN 100000 [USP'U]/G
POWDER TOPICAL 4 TIMES DAILY
Qty: 30 G | Refills: 2 | Status: SHIPPED | OUTPATIENT
Start: 2023-03-21

## 2023-03-21 RX ORDER — NYSTATIN AND TRIAMCINOLONE ACETONIDE 100000; 1 [USP'U]/G; MG/G
OINTMENT TOPICAL 2 TIMES DAILY
Qty: 30 G | Refills: 0 | Status: SHIPPED | OUTPATIENT
Start: 2023-03-21

## 2023-03-21 NOTE — PROGRESS NOTES
Problem Visit    Sydni Waldron is a 40 y.o. G0 presenting for problem visit. Her main concern today is  inner thigh skin irritation . Recent high salinity float tank caused burning to area. Denies any pain today. Ob/Gyn Hx:  G0  LMP 2/28/23  Menarche- 13  Menses- regular per patient  Contraception- abstinence  STI- denies  ? SA- never    Health maintenance:  Pap- pap NILM HPV neg 6/15/21  Gardasil-    Past Medical History:   Diagnosis Date    Anxiety     Depression     [place]    Elevated blood pressure 5/31/2011    Encounter for Nexplanon removal 04/01/2022    Hypertension     Irregular menses     Migraine 8/2/2011    Obesity     DENISE (obstructive sleep apnea)     cpap at 8    PCOS (polycystic ovarian syndrome)     endocrine Dr. Luis F Gray       History reviewed. No pertinent surgical history.     Family History   Problem Relation Age of Onset    Diabetes Mother     Other Mother         obesity    OSTEOARTHRITIS Mother     Diabetes Father     Hypertension Father     Other Father         obesity    Prostate Cancer Father     Heart Disease Maternal Grandmother     Stroke Maternal Grandfather     Other Brother         obesity       Social History     Socioeconomic History    Marital status: SINGLE     Spouse name: Not on file    Number of children: Not on file    Years of education: Not on file    Highest education level: Not on file   Occupational History    Not on file   Tobacco Use    Smoking status: Never    Smokeless tobacco: Never   Vaping Use    Vaping Use: Never used   Substance and Sexual Activity    Alcohol use: Not Currently     Comment: rare    Drug use: No    Sexual activity: Never   Other Topics Concern    Not on file   Social History Narrative    Not on file     Social Determinants of Health     Financial Resource Strain: Unknown    Difficulty of Paying Living Expenses: Patient refused   Food Insecurity: Unknown    Worried About Running Out of Food in the Last Year: Patient refused    Ran Out of Food in the Last Year: Patient refused   Transportation Needs: Not on file   Physical Activity: Not on file   Stress: Not on file   Social Connections: Not on file   Intimate Partner Violence: Not on file   Housing Stability: Not on file       Current Outpatient Medications   Medication Sig Dispense Refill    omeprazole (PRILOSEC) 20 mg capsule Take 1 Capsule by mouth in the morning. 90 Capsule 1    clindamycin (CLINDAGEL) 1 % topical gel Apply  to affected area two (2) times a day. use thin film on affected area 60 g 5    metFORMIN ER (GLUCOPHAGE XR) 500 mg tablet TAKE 4 TABLETS DAILY WITH DINNER 360 Tablet 3    sertraline (ZOLOFT) 100 mg tablet 250 mg.      polyethylene glycol (MIRALAX) 17 gram/dose powder Take 17 g by mouth daily. clean out - 7 capfuls of miralax in a 32 oz gatorade - drink over a few hours. May need to repeat x 2-3 days depending on results. 527 g 5    hydrocortisone (HYTONE) 2.5 % topical cream APPLY AS NEEDED FOR FLARES ON CHEST      metroNIDAZOLE (METROCREAM) 0.75 % topical cream       ketoconazole (NIZORAL) 2 % topical cream Apply  to affected area two (2) times a day. 30 g 1    LORazepam (ATIVAN) 0.5 mg tablet Take 0.5 mg by mouth. Take one half   Indications: patient reports takes 1-2 tablets up to 3 times daily as needed      lithium carbonate 300 mg capsule  (Patient not taking: Reported on 3/21/2023)      lithium carbonate 300 mg tablet Take 150 mg by mouth daily. (Patient not taking: Reported on 3/21/2023)      triamcinolone acetonide (KENALOG) 0.1 % topical cream Apply  to affected area two (2) times a day. use thin layer x 3-5 days (Patient not taking: Reported on 3/21/2023) 30 g 1       Allergies   Allergen Reactions    Topiramate Other (comments)     Suicidal thoughts  Other reaction(s):  Other  Suicidal thoughts       Review of Systems - History obtained from the patient  Constitutional: negative for weight loss, fever, night sweats  HEENT: negative for hearing loss, earache, congestion, snoring, sorethroat  CV: negative for chest pain, palpitations, edema  Resp: negative for cough, shortness of breath, wheezing  GI: negative for change in bowel habits, abdominal pain, black or bloody stools  : negative for frequency, dysuria, hematuria, vaginal discharge, +inguinal skin irritation  MSK: negative for back pain, joint pain, muscle pain  Breast: negative for breast lumps, nipple discharge, galactorrhea  Skin :negative for itching, rash, hives  Neuro: negative for dizziness, headache, confusion, weakness  Psych: negative for anxiety, depression, change in mood  Heme/lymph: negative for bleeding, bruising, pallor    Physical Exam  Visit Vitals  /84 (BP 1 Location: Left arm, BP Patient Position: Sitting, BP Cuff Size: Large adult)   Ht 5' 4\" (1.626 m)   Wt 240 lb (108.9 kg)   LMP 02/28/2023 (Approximate)   BMI 41.20 kg/m²     Constitutional  Appearance: well-nourished, well developed, alert, in no acute distress    HENT  Head and Face: appears normal    Chest  Respiratory Effort: non-labored breathing  Auscultation: CTAB, normal breath sounds    Cardiovascular  Heart:   Auscultation: regular rate and rhythm without murmur  Extremities: no peripheral edema    Gastrointestinal  Abdominal Examination: abdomen non-tender to palpation, normal bowel sounds, no masses present  Liver and spleen: no hepatomegaly present, spleen not palpable  Hernias: no hernias identified    Genitourinary  External Genitalia: normal appearance for age, no discharge present, no tenderness present, no inflammatory lesions present, no masses present, no atrophy present, Slight erythema bilateral inguinal creases  Vagina: normal vaginal vault without central or paravaginal defects, no discharge present, no inflammatory lesions present, no masses present  Bladder: non-tender to palpation  Urethra: appears normal  Cervix: normal   Uterus: normal size, shape and consistency  Adnexa: no adnexal tenderness present, no adnexal masses present  Perineum: perineum within normal limits, no evidence of trauma, no rashes or skin lesions present    Skin  General Inspection: no rash, no lesions identified    Neurologic/Psychiatric  Mental Status:  Orientation: grossly oriented to person, place and time  Mood and Affect: mood normal, affect appropriate      Assessment/Plan:  40 y.o. with inguinal cutaneous candidiasis.     -Rx mycolog and nystatin powder  -advised keeping area clean and dry    RTC: for AE or sooner prn    Peggy Levy MD  3/21/2023  4:06 PM

## 2023-07-17 NOTE — PROGRESS NOTES
ABNORMALITIES ARE SEEN. RIGHT OVARY APPEARS ENLARGED WITH MULTIPLE FOLLICLES SEEN ON THE PERIPHERY. LEFT OVARY APPEARS WNL. NO FREE FLUID IS SEEN IN THE CDS. Ob/Gyn Hx:  G0   LMP- 07/15/23  Menarche- 13  Menses-regular since doing accupuncture, have been irregular in the past  Contraception-denies  STI- denies  ? SA-never    Health maintenance:  Pap-6/15/21 NILM/HPV Neg  Faith Baird    Past Medical History:   Diagnosis Date    Anxiety     Depression     Devon Hatfield    Elevated blood pressure 5/31/2011    Encounter for Nexplanon removal 04/01/2022    Hypertension     Irregular menses     Migraine 8/2/2011    Obesity     MIKE (obstructive sleep apnea)     cpap at 8    PCOS (polycystic ovarian syndrome)     endocrine Dr. Belkis Salmon       No past surgical history on file.       Family History   Problem Relation Age of Onset    Diabetes Mother     Other Mother         obesity    OSTEOARTHRITIS Mother     Diabetes Father     Hypertension Father     Other Father         obesity    Prostate Cancer Father     Heart Disease Maternal Grandmother     Stroke Maternal Grandfather     Other Brother         obesity       Social History     Socioeconomic History    Marital status: SINGLE     Spouse name: Not on file    Number of children: Not on file    Years of education: Not on file    Highest education level: Not on file   Occupational History    Not on file   Tobacco Use    Smoking status: Never    Smokeless tobacco: Never   Vaping Use    Vaping Use: Never used   Substance and Sexual Activity    Alcohol use: Yes     Comment: rare    Drug use: No    Sexual activity: Never   Other Topics Concern    Not on file   Social History Narrative    Not on file     Social Determinants of Health     Financial Resource Strain: Not on file   Food Insecurity: Not on file   Transportation Needs: Not on file   Physical Activity: Not on file   Stress: Not on file   Social Connections: Not on file   Intimate Partner Violence: Not on file

## 2023-07-18 ENCOUNTER — OFFICE VISIT (OUTPATIENT)
Age: 38
End: 2023-07-18
Payer: COMMERCIAL

## 2023-07-18 VITALS — DIASTOLIC BLOOD PRESSURE: 84 MMHG | WEIGHT: 293 LBS | BODY MASS INDEX: 55.41 KG/M2 | SYSTOLIC BLOOD PRESSURE: 122 MMHG

## 2023-07-18 DIAGNOSIS — Z01.419 ENCOUNTER FOR GYNECOLOGICAL EXAMINATION WITHOUT ABNORMAL FINDING: Primary | ICD-10-CM

## 2023-07-18 PROCEDURE — 99395 PREV VISIT EST AGE 18-39: CPT | Performed by: OBSTETRICS & GYNECOLOGY

## 2024-03-06 NOTE — ED TRIAGE NOTES
Continued Stay Note  [facility]     Patient Name: Pam Vidal  MRN: [id number]  Today's Date: 3/6/2024    Admit Date: 3/3/2024    Plan: [facility] on Thursday, 3/7 @ 1500   Discharge Plan       Row Name 03/06/24 1434       Plan    Plan [facility] on Thursday, 3/7 @ 1500    Plan Comments Spoke with [facility] (covering for [facility]) [facility] will accept, bed is available. Follow up with patient, she is agreeable with dc to [facility]. [facility] has a pharmacy on site, only need dc summary w/ dc meds. [facility] scheduled to transport tomorrow, Thur, 3/7 @ 1500. S/w [place] [phone] conf # [id number]. [facility] cannot accept patient on chemo meds d/t cost. Oncology progress record noted, plan is to hold treatment while in rehab and until f/u  oncology appointment.                   Discharge Codes    No documentation.                 Expected Discharge Date and Time       Expected Discharge Date Expected Discharge Time    Mar 7, 2024               Jayda Evans RN     Pt arrives with migraine which is resolving at this time, but pt is \"scared\" because these migraines and the symptoms that are associated with her migraines which include R arm numbness and R facial numbness. Pt reports she does no longer have these symptoms but was scared and wanted to come in. Pt's neurologist, Dr. Sylvia Girard told her she did not need to be seen for repeat migraine symptom, as this typical for her migraine. Pt also reports visual aura prior to symptoms. Pt has no headache and no symptoms at this time.

## 2024-04-26 SDOH — ECONOMIC STABILITY: HOUSING INSECURITY
IN THE LAST 12 MONTHS, WAS THERE A TIME WHEN YOU DID NOT HAVE A STEADY PLACE TO SLEEP OR SLEPT IN A SHELTER (INCLUDING NOW)?: NO

## 2024-04-26 SDOH — ECONOMIC STABILITY: FOOD INSECURITY: WITHIN THE PAST 12 MONTHS, YOU WORRIED THAT YOUR FOOD WOULD RUN OUT BEFORE YOU GOT MONEY TO BUY MORE.: NEVER TRUE

## 2024-04-26 SDOH — ECONOMIC STABILITY: FOOD INSECURITY: WITHIN THE PAST 12 MONTHS, THE FOOD YOU BOUGHT JUST DIDN'T LAST AND YOU DIDN'T HAVE MONEY TO GET MORE.: NEVER TRUE

## 2024-04-26 SDOH — ECONOMIC STABILITY: TRANSPORTATION INSECURITY
IN THE PAST 12 MONTHS, HAS LACK OF TRANSPORTATION KEPT YOU FROM MEETINGS, WORK, OR FROM GETTING THINGS NEEDED FOR DAILY LIVING?: NO

## 2024-04-26 SDOH — ECONOMIC STABILITY: INCOME INSECURITY: HOW HARD IS IT FOR YOU TO PAY FOR THE VERY BASICS LIKE FOOD, HOUSING, MEDICAL CARE, AND HEATING?: NOT HARD AT ALL

## 2024-04-29 ENCOUNTER — OFFICE VISIT (OUTPATIENT)
Age: 39
End: 2024-04-29
Payer: COMMERCIAL

## 2024-04-29 VITALS — WEIGHT: 293 LBS | SYSTOLIC BLOOD PRESSURE: 122 MMHG | BODY MASS INDEX: 56.37 KG/M2 | DIASTOLIC BLOOD PRESSURE: 84 MMHG

## 2024-04-29 DIAGNOSIS — N92.0 MENORRHAGIA WITH REGULAR CYCLE: Primary | ICD-10-CM

## 2024-04-29 LAB
ERYTHROCYTE [DISTWIDTH] IN BLOOD BY AUTOMATED COUNT: 14.6 % (ref 11.5–14.5)
FERRITIN SERPL-MCNC: 16 NG/ML (ref 8–252)
HCT VFR BLD AUTO: 41 % (ref 35–47)
HGB BLD-MCNC: 13 G/DL (ref 11.5–16)
MCH RBC QN AUTO: 27.8 PG (ref 26–34)
MCHC RBC AUTO-ENTMCNC: 31.7 G/DL (ref 30–36.5)
MCV RBC AUTO: 87.8 FL (ref 80–99)
NRBC # BLD: 0 K/UL (ref 0–0.01)
NRBC BLD-RTO: 0 PER 100 WBC
PLATELET # BLD AUTO: 237 K/UL (ref 150–400)
PMV BLD AUTO: 11.3 FL (ref 8.9–12.9)
RBC # BLD AUTO: 4.67 M/UL (ref 3.8–5.2)
WBC # BLD AUTO: 7.7 K/UL (ref 3.6–11)

## 2024-04-29 PROCEDURE — 99213 OFFICE O/P EST LOW 20 MIN: CPT | Performed by: OBSTETRICS & GYNECOLOGY

## 2024-04-29 NOTE — PROGRESS NOTES
Chief Complaint   Patient presents with    Vaginal Bleeding     Vanessa Kong is a 37 y.o. with pmh of PCOS (diagnosed clinically by anovulatory cycles and polycystic appearance to ovaries) presenting for a problem visit.  Her main concerns today include: Menorrhagia with regular cycle    Denies any dysmenorrhea during cycles    The patient sent a [employer] message 4/24 c/o heavy vaginal bleeding causing her to miss work at least once per month.  Is not interested in ocps/IUD.  Inquiring if Ibuprofen therapy or tranexamic acid is an option.    From Dr. Ryan's last note, 07/18/24: annual exam. Doing well!     From last annual exam, 10/17/22: Pt with long h/o nausea, chronic low back pain, and anxiety/depression associated with menses. Wonders if she could have endometriosis.      Pt has tried POPs without improvement in symptoms, and Nexplanon had negative impacts on her mood (removed 4/2022). Currently not on any hormonal contraception for regulation of menses. She has been doing accupuncture, and actually reports menses have been monthly with decreased symptoms. She is overall feeling better.       She has also followed with chiropractor for adjustments as she has had chronic low back pain for years. She has also seen orthopedic spine doctors, had MRI showing some degenerative changes. She has done PT and steroid injection of SI joint, with some relief of back pain.     Estrogen CI d/t multiple other comorbidities including HTN, migraines, etc. Pt is obese. She has been on metformin for management of insulin resistance and PCOS. She suffers from significant anxiety, depression and follows closely with psychiatry (who did not think trial of Orilissa was a good idea for her mental health). She has also been worked up by neurologist (had some \"abnormal brain lesion\"), but reports MS was ruled out. Also with occasional GERD, and diarrhea assoc with metformin.      Never sexually active.     PCP - Dr. Miller 
occasional GERD, and diarrhea assoc with metformin.      Never sexually active.     PCP - Dr. Miller passed away - pt very saddened by this as she had a lot of trust in him for her medical care, tearful today.  New pcp Remy Chandler MD.     Previously followed with Maureen Sheldon NP at [facility].    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      TV ULTRASOUND 6/15/21  THE UTERUS IS ANTEVERTED, NORMAL IN SIZE AND ECHOGENICITY.  THE ENDOMETRIUM MEASURES 9.0MM IN THICKNESS. NO MASSES OR ABNORMALITIES ARE SEEN.  RIGHT OVARY APPEARS ENLARGED WITH MULTIPLE FOLLICLES SEEN ON THE PERIPHERY.  LEFT OVARY APPEARS WNL.  NO FREE FLUID IS SEEN IN THE CDS.     Ob/Gyn Hx:  G0   LMP- 4/22/24  Menarche- 13  Menses-regular since doing accupuncture, have been irregular in the past  Contraception-denies  STI- denies  ?SA-never     Health maintenance:  Pap-6/15/21 NILM/HPV Neg  Gardasil-denies    Past Medical History:   Diagnosis Date    Anxiety     Depression     Keyla Quintanilla    Elevated blood pressure 5/31/2011    Encounter for Nexplanon removal 04/01/2022    Hypertension     Irregular menses     Migraine 8/2/2011    Obesity     MIKE (obstructive sleep apnea)     cpap at 8    PCOS (polycystic ovarian syndrome)     endocrine Dr. Jones       No past surgical history on file.      Family History   Problem Relation Age of Onset    Diabetes Mother     Other Mother         obesity    OSTEOARTHRITIS Mother     Diabetes Father     Hypertension Father     Other Father         obesity    Prostate Cancer Father     Heart Disease Maternal Grandmother     Stroke Maternal Grandfather     Other Brother         obesity       Social History     Socioeconomic History    Marital status: SINGLE     Spouse name: Not on file    Number of children: Not on file    Years of education: Not on file    Highest education level: Not on file   Occupational History    Not on file   Tobacco Use    Smoking status: Never

## 2024-05-01 LAB — TSH SERPL DL<=0.05 MIU/L-ACNC: 2.6 UIU/ML (ref 0.45–4.5)

## 2024-07-29 ENCOUNTER — OFFICE VISIT (OUTPATIENT)
Age: 39
End: 2024-07-29
Payer: COMMERCIAL

## 2024-07-29 VITALS — SYSTOLIC BLOOD PRESSURE: 120 MMHG | WEIGHT: 293 LBS | BODY MASS INDEX: 56.92 KG/M2 | DIASTOLIC BLOOD PRESSURE: 84 MMHG

## 2024-07-29 DIAGNOSIS — Z01.419 ENCOUNTER FOR WELL WOMAN EXAM WITH ROUTINE GYNECOLOGICAL EXAM: Primary | ICD-10-CM

## 2024-07-29 DIAGNOSIS — Z11.51 ENCOUNTER FOR SCREENING FOR HUMAN PAPILLOMAVIRUS (HPV): ICD-10-CM

## 2024-07-29 PROCEDURE — 99395 PREV VISIT EST AGE 18-39: CPT | Performed by: OBSTETRICS & GYNECOLOGY

## 2024-07-29 NOTE — PROGRESS NOTES
Chief Complaint   Patient presents with    Annual Exam    Ultrasound    Vaginal Bleeding     Vanessa Kong is a 36 yo with pmh of PCOS (diagnosed clinically by anovulatory cycles and polycystic appearance to ovaries) presenting Ultrasound and HMB follow up/AE.    CBC, ferritin, TSH wnl on 4/29/24    Struggling with weight and what she believes is PMDD.  Pt tearful today during discussion.  Increase in HA's since last ov     Per Dr. Ryan's last note, 04/29/24: HMB (with regular cycle). Denies dysmenorrhea. Reports menses last 4-5 days, and pt with heavy flow and passage of small clots on days 2 and 3 (uses both pad and super tampon, changes every ~4 hours). She does take iron supplement daily. Menses generally monthly, will occasionally skip a cycle (last in January). No intermenstrual bleeding.     The patient sent a [employer] message 4/24 c/o heavy vaginal bleeding causing her to miss work at least once per month. Wary of hormonal contraception due to side effects in the past.  Inquiring if Ibuprofen therapy or tranexamic acid is an option.     Long h/o nausea, chronic low back pain, and anxiety/depression associated with menses. Wonders if she could have endometriosis.      Pt has tried POPs without improvement in symptoms, and Nexplanon had negative impacts on her mood (removed 4/2022). Currently not on any hormonal contraception for regulation of menses. She has been doing accupuncture, and actually reports menses have been monthly with decreased symptoms. She is overall feeling better.       She has also followed with chiropractor for adjustments as she has had chronic low back pain for years. She has also seen orthopedic spine doctors, had MRI showing some degenerative changes. She has done PT and steroid injection of SI joint, with some relief of back pain.     Estrogen CI d/t multiple other comorbidities including HTN, migraines, etc. Pt is obese. She has been on metformin for management of insulin

## 2024-07-29 NOTE — PROGRESS NOTES
Problem Visit    Vanessa Kong is a 36 yo with pmh of PCOS (diagnosed clinically by anovulatory cycles and polycystic appearance to ovaries) presenting Ultrasound and HMB follow up/AE.    Bleeding is manageable at this time. Declines IUD/ablation options discussed at last visit.      CBC, ferritin, TSH wnl on 4/29/24. US today showing normal pelvic structures.     Struggling with weight and what she believes is PMDD.  Pt tearful today during discussion.  Increase in HA's since last ov. Taking Migrelief inconsistently.    Ferritin and vit D at low ends of normal range, discussed supplementation.     Per Dr. Ryan's last note, 04/29/24: HMB (with regular cycle). Denies dysmenorrhea. Reports menses last 4-5 days, and pt with heavy flow and passage of small clots on days 2 and 3 (uses both pad and super tampon, changes every ~4 hours). She does take iron supplement daily. Menses generally monthly, will occasionally skip a cycle (last in January). No intermenstrual bleeding.     The patient sent a [employer] message 4/24 c/o heavy vaginal bleeding causing her to miss work at least once per month. Wary of hormonal contraception due to side effects in the past.  Inquiring if Ibuprofen therapy or tranexamic acid is an option.     Long h/o nausea, chronic low back pain, and anxiety/depression associated with menses. Wonders if she could have endometriosis.      Pt has tried POPs without improvement in symptoms, and Nexplanon had negative impacts on her mood (removed 4/2022). Currently not on any hormonal contraception for regulation of menses. She has been doing accupuncture, and actually reports menses have been monthly with decreased symptoms. She is overall feeling better.       She has also followed with chiropractor for adjustments as she has had chronic low back pain for years. She has also seen orthopedic spine doctors, had MRI showing some degenerative changes. She has done PT and steroid injection of SI joint,

## 2024-08-01 LAB
CYTOLOGIST CVX/VAG CYTO: NORMAL
CYTOLOGY CVX/VAG DOC CYTO: NORMAL
CYTOLOGY CVX/VAG DOC THIN PREP: NORMAL
DX ICD CODE: NORMAL
HPV GENOTYPE REFLEX: NORMAL
HPV I/H RISK 4 DNA CVX QL PROBE+SIG AMP: NEGATIVE
Lab: NORMAL
OTHER STN SPEC: NORMAL
STAT OF ADQ CVX/VAG CYTO-IMP: NORMAL

## 2024-10-05 ENCOUNTER — HOSPITAL ENCOUNTER (EMERGENCY)
Facility: HOSPITAL | Age: 39
Discharge: HOME | End: 2024-10-05
Attending: STUDENT IN AN ORGANIZED HEALTH CARE EDUCATION/TRAINING PROGRAM
Payer: COMMERCIAL

## 2024-10-05 ENCOUNTER — APPOINTMENT (OUTPATIENT)
Facility: HOSPITAL | Age: 39
End: 2024-10-05
Payer: COMMERCIAL

## 2024-10-05 VITALS
WEIGHT: 293 LBS | DIASTOLIC BLOOD PRESSURE: 91 MMHG | OXYGEN SATURATION: 96 % | BODY MASS INDEX: 50.02 KG/M2 | HEIGHT: 64 IN | HEART RATE: 96 BPM | TEMPERATURE: 99.2 F | SYSTOLIC BLOOD PRESSURE: 153 MMHG | RESPIRATION RATE: 20 BRPM

## 2024-10-05 DIAGNOSIS — J02.9 ACUTE PHARYNGITIS, UNSPECIFIED ETIOLOGY: Primary | ICD-10-CM

## 2024-10-05 LAB
ALBUMIN SERPL-MCNC: 3.2 G/DL (ref 3.5–5)
ALBUMIN/GLOB SERPL: 0.7 (ref 1.1–2.2)
ALP SERPL-CCNC: 126 U/L (ref 45–117)
ALT SERPL-CCNC: 46 U/L (ref 12–78)
ANION GAP SERPL CALC-SCNC: 10 MMOL/L (ref 2–12)
AST SERPL-CCNC: 34 U/L (ref 15–37)
BASOPHILS # BLD: 0 K/UL (ref 0–0.1)
BASOPHILS NFR BLD: 0 % (ref 0–1)
BILIRUB SERPL-MCNC: 0.4 MG/DL (ref 0.2–1)
BUN SERPL-MCNC: 14 MG/DL (ref 6–20)
BUN/CREAT SERPL: 16 (ref 12–20)
CALCIUM SERPL-MCNC: 9.1 MG/DL (ref 8.5–10.1)
CHLORIDE SERPL-SCNC: 99 MMOL/L (ref 97–108)
CO2 SERPL-SCNC: 26 MMOL/L (ref 21–32)
CREAT SERPL-MCNC: 0.85 MG/DL (ref 0.55–1.02)
DIFFERENTIAL METHOD BLD: ABNORMAL
EOSINOPHIL # BLD: 0 K/UL (ref 0–0.4)
EOSINOPHIL NFR BLD: 0 % (ref 0–7)
ERYTHROCYTE [DISTWIDTH] IN BLOOD BY AUTOMATED COUNT: 13 % (ref 11.5–14.5)
FLUAV RNA SPEC QL NAA+PROBE: NOT DETECTED
FLUBV RNA SPEC QL NAA+PROBE: NOT DETECTED
GLOBULIN SER CALC-MCNC: 4.9 G/DL (ref 2–4)
GLUCOSE SERPL-MCNC: 110 MG/DL (ref 65–100)
HCT VFR BLD AUTO: 35.6 % (ref 35–47)
HGB BLD-MCNC: 11.5 G/DL (ref 11.5–16)
IMM GRANULOCYTES # BLD AUTO: 0 K/UL (ref 0–0.04)
IMM GRANULOCYTES NFR BLD AUTO: 0 % (ref 0–0.5)
LYMPHOCYTES # BLD: 2.5 K/UL (ref 0.8–3.5)
LYMPHOCYTES NFR BLD: 19 % (ref 12–49)
MCH RBC QN AUTO: 27.2 PG (ref 26–34)
MCHC RBC AUTO-ENTMCNC: 32.3 G/DL (ref 30–36.5)
MCV RBC AUTO: 84.2 FL (ref 80–99)
MONOCYTES # BLD: 0.8 K/UL (ref 0–1)
MONOCYTES NFR BLD: 6 % (ref 5–13)
NEUTS SEG # BLD: 9.9 K/UL (ref 1.8–8)
NEUTS SEG NFR BLD: 75 % (ref 32–75)
NRBC # BLD: 0 K/UL (ref 0–0.01)
NRBC BLD-RTO: 0 PER 100 WBC
PLATELET # BLD AUTO: 285 K/UL (ref 150–400)
PMV BLD AUTO: 11.1 FL (ref 8.9–12.9)
POTASSIUM SERPL-SCNC: 3.7 MMOL/L (ref 3.5–5.1)
PROT SERPL-MCNC: 8.1 G/DL (ref 6.4–8.2)
RBC # BLD AUTO: 4.23 M/UL (ref 3.8–5.2)
S PYO DNA THROAT QL NAA+PROBE: NOT DETECTED
SARS-COV-2 RNA RESP QL NAA+PROBE: NOT DETECTED
SODIUM SERPL-SCNC: 135 MMOL/L (ref 136–145)
SOURCE: NORMAL
WBC # BLD AUTO: 13.2 K/UL (ref 3.6–11)

## 2024-10-05 PROCEDURE — 6360000004 HC RX CONTRAST MEDICATION: Performed by: PHYSICIAN ASSISTANT

## 2024-10-05 PROCEDURE — 6370000000 HC RX 637 (ALT 250 FOR IP): Performed by: PHYSICIAN ASSISTANT

## 2024-10-05 PROCEDURE — 85025 COMPLETE CBC W/AUTO DIFF WBC: CPT

## 2024-10-05 PROCEDURE — 87651 STREP A DNA AMP PROBE: CPT

## 2024-10-05 PROCEDURE — 99285 EMERGENCY DEPT VISIT HI MDM: CPT

## 2024-10-05 PROCEDURE — 80053 COMPREHEN METABOLIC PANEL: CPT

## 2024-10-05 PROCEDURE — 36415 COLL VENOUS BLD VENIPUNCTURE: CPT

## 2024-10-05 PROCEDURE — 70491 CT SOFT TISSUE NECK W/DYE: CPT

## 2024-10-05 PROCEDURE — 87636 SARSCOV2 & INF A&B AMP PRB: CPT

## 2024-10-05 PROCEDURE — 71046 X-RAY EXAM CHEST 2 VIEWS: CPT

## 2024-10-05 PROCEDURE — 2580000003 HC RX 258: Performed by: PHYSICIAN ASSISTANT

## 2024-10-05 RX ORDER — IBUPROFEN 600 MG/1
600 TABLET, FILM COATED ORAL ONCE
Status: DISCONTINUED | OUTPATIENT
Start: 2024-10-05 | End: 2024-10-06 | Stop reason: HOSPADM

## 2024-10-05 RX ORDER — DOXYCYCLINE HYCLATE 100 MG
100 TABLET ORAL 2 TIMES DAILY
Qty: 28 TABLET | Refills: 0 | Status: SHIPPED | OUTPATIENT
Start: 2024-10-05 | End: 2024-10-06

## 2024-10-05 RX ORDER — IBUPROFEN 600 MG/1
600 TABLET, FILM COATED ORAL EVERY 6 HOURS PRN
Qty: 28 TABLET | Refills: 0 | Status: SHIPPED | OUTPATIENT
Start: 2024-10-05 | End: 2024-10-12

## 2024-10-05 RX ORDER — IOPAMIDOL 612 MG/ML
100 INJECTION, SOLUTION INTRAVASCULAR
Status: COMPLETED | OUTPATIENT
Start: 2024-10-05 | End: 2024-10-05

## 2024-10-05 RX ORDER — 0.9 % SODIUM CHLORIDE 0.9 %
1000 INTRAVENOUS SOLUTION INTRAVENOUS ONCE
Status: COMPLETED | OUTPATIENT
Start: 2024-10-05 | End: 2024-10-05

## 2024-10-05 RX ORDER — IOPAMIDOL 755 MG/ML
100 INJECTION, SOLUTION INTRAVASCULAR
Status: DISCONTINUED | OUTPATIENT
Start: 2024-10-05 | End: 2024-10-05

## 2024-10-05 RX ORDER — DOXYCYCLINE HYCLATE 100 MG
100 TABLET ORAL ONCE
Status: COMPLETED | OUTPATIENT
Start: 2024-10-05 | End: 2024-10-05

## 2024-10-05 RX ORDER — ACETAMINOPHEN 500 MG
1000 TABLET ORAL EVERY 6 HOURS PRN
Qty: 56 TABLET | Refills: 0 | Status: SHIPPED | OUTPATIENT
Start: 2024-10-05 | End: 2024-10-12

## 2024-10-05 RX ORDER — DOXYCYCLINE HYCLATE 100 MG
100 TABLET ORAL 2 TIMES DAILY
Qty: 20 TABLET | Refills: 0 | Status: SHIPPED | OUTPATIENT
Start: 2024-10-05 | End: 2024-10-05

## 2024-10-05 RX ADMIN — DOXYCYCLINE HYCLATE 100 MG: 100 TABLET, COATED ORAL at 23:06

## 2024-10-05 RX ADMIN — SODIUM CHLORIDE 1000 ML: 9 INJECTION, SOLUTION INTRAVENOUS at 20:58

## 2024-10-05 RX ADMIN — IOPAMIDOL 100 ML: 612 INJECTION, SOLUTION INTRAVENOUS at 20:26

## 2024-10-05 ASSESSMENT — PAIN DESCRIPTION - LOCATION: LOCATION: THROAT

## 2024-10-05 ASSESSMENT — PAIN SCALES - GENERAL: PAINLEVEL_OUTOF10: 3

## 2024-10-05 ASSESSMENT — PAIN - FUNCTIONAL ASSESSMENT: PAIN_FUNCTIONAL_ASSESSMENT: 0-10

## 2024-10-06 RX ORDER — DOXYCYCLINE HYCLATE 100 MG
100 TABLET ORAL 2 TIMES DAILY
Qty: 28 TABLET | Refills: 0 | Status: SHIPPED | OUTPATIENT
Start: 2024-10-06 | End: 2024-10-20

## 2024-10-06 NOTE — DISCHARGE INSTRUCTIONS
Rotate ibuprofen and Tylenol as needed for pain.  Take oral antibiotic to completion.  Call ENT specialist Monday morning for close outpatient follow-up for further evaluation.  Also reach out to your primary care provider Monday morning for close outpatient follow-up.  Return immediately if any new or worsening problems.  Thank you for allowing us to be part of your care.

## 2024-10-07 ASSESSMENT — ENCOUNTER SYMPTOMS: SORE THROAT: 1

## 2024-10-07 NOTE — ED PROVIDER NOTES
[facility]  EMERGENCY DEPARTMENT ENCOUNTER      Pt Name: Vanessa Kong  MRN: [id number]  Birthdate 1985  Date of evaluation: 10/5/2024  Provider: Delmar Potts PA-C    CHIEF COMPLAINT       Chief Complaint   Patient presents with    Fever    Nasal Congestion         HISTORY OF PRESENT ILLNESS   (Location/Symptom, Timing/Onset, Context/Setting, Quality, Duration, Modifying Factors, Severity)  Note limiting factors.   Vanessa Kong is a 39 y.o. female presenting to ED for sore throat with fevers for 4 weeks.  She has been tested for COVID, flu, strep, mono, which have all been negative.  Denies recent dental procedure.  Denies weight loss.  Completed 1 weeks of augmenting 3 days ago, with no relief.  Admits to postnasal drip, nasal congestion.  Denies cough, CP, SOB, drooling, inability to eat and drink, abd pain, N/V/D, urinary symptoms.            Review of External Medical Records:     Nursing Notes were reviewed.    REVIEW OF SYSTEMS    (2-9 systems for level 4, 10 or more for level 5)     Review of Systems   Constitutional:  Positive for fever.   HENT:  Positive for congestion, postnasal drip and sore throat.    All other systems reviewed and are negative.      Except as noted above the remainder of the review of systems was reviewed and negative.       PAST MEDICAL HISTORY     Past Medical History:   Diagnosis Date    Anxiety     Depression     Keyla Quintanilla    Elevated blood pressure 5/31/2011    Encounter for Nexplanon removal 04/01/2022    Hypertension     Irregular menses     Migraine 8/2/2011    Obesity     MIKE (obstructive sleep apnea)     cpap at 8    PCOS (polycystic ovarian syndrome)     endocrine Dr. Jones         SURGICAL HISTORY     History reviewed. No pertinent surgical history.      CURRENT MEDICATIONS       Discharge Medication List as of 10/5/2024 10:48 PM        CONTINUE these medications which have NOT CHANGED    Details   clindamycin (CLINDAGEL) 1 % gel Apply

## 2025-01-16 ENCOUNTER — HOSPITAL ENCOUNTER (OUTPATIENT)
Facility: HOSPITAL | Age: 40
Discharge: HOME | End: 2025-01-19
Payer: COMMERCIAL

## 2025-01-16 DIAGNOSIS — G43.109 BASILAR MIGRAINE: ICD-10-CM

## 2025-01-16 PROCEDURE — 70553 MRI BRAIN STEM W/O & W/DYE: CPT

## 2025-01-16 PROCEDURE — A9579 GAD-BASE MR CONTRAST NOS,1ML: HCPCS | Performed by: RADIOLOGY

## 2025-01-16 PROCEDURE — 6360000004 HC RX CONTRAST MEDICATION: Performed by: RADIOLOGY

## 2025-01-16 RX ADMIN — GADOTERIDOL 20 ML: 279.3 INJECTION, SOLUTION INTRAVENOUS at 19:05

## 2025-04-13 ENCOUNTER — OFFICE VISIT (OUTPATIENT)
Age: 40
End: 2025-04-13

## 2025-04-13 VITALS
DIASTOLIC BLOOD PRESSURE: 86 MMHG | OXYGEN SATURATION: 97 % | BODY MASS INDEX: 56.13 KG/M2 | RESPIRATION RATE: 19 BRPM | TEMPERATURE: 98.5 F | SYSTOLIC BLOOD PRESSURE: 126 MMHG | WEIGHT: 293 LBS | HEART RATE: 98 BPM

## 2025-04-13 DIAGNOSIS — R30.0 BURNING WITH URINATION: Primary | ICD-10-CM

## 2025-04-13 LAB
BILIRUBIN, URINE, POC: NEGATIVE
BLOOD URINE, POC: NEGATIVE
GLUCOSE URINE, POC: NEGATIVE
KETONES, URINE, POC: NEGATIVE
LEUKOCYTE ESTERASE, URINE, POC: ABNORMAL
NITRITE, URINE, POC: NEGATIVE
PH, URINE, POC: 5.5 (ref 4.6–8)
PROTEIN,URINE, POC: NEGATIVE
SPECIFIC GRAVITY, URINE, POC: 1.02 (ref 1–1.03)
URINALYSIS CLARITY, POC: CLEAR
URINALYSIS COLOR, POC: YELLOW
UROBILINOGEN, POC: ABNORMAL

## 2025-04-13 RX ORDER — PROPRANOLOL HYDROCHLORIDE 40 MG/1
40 TABLET ORAL DAILY
COMMUNITY

## 2025-04-13 RX ORDER — MONTELUKAST SODIUM 10 MG/1
10 TABLET ORAL NIGHTLY
COMMUNITY
Start: 2025-03-28 | End: 2026-03-28

## 2025-04-13 RX ORDER — RIMEGEPANT SULFATE 75 MG/75MG
75 TABLET, ORALLY DISINTEGRATING ORAL
COMMUNITY
Start: 2025-01-30

## 2025-04-15 LAB
BACTERIA SPEC CULT: NORMAL
CC UR VC: NORMAL
SERVICE CMNT-IMP: NORMAL